# Patient Record
Sex: MALE | Race: WHITE | NOT HISPANIC OR LATINO | Employment: UNEMPLOYED | URBAN - NONMETROPOLITAN AREA
[De-identification: names, ages, dates, MRNs, and addresses within clinical notes are randomized per-mention and may not be internally consistent; named-entity substitution may affect disease eponyms.]

---

## 2020-08-17 ENCOUNTER — OFFICE VISIT (OUTPATIENT)
Dept: FAMILY MEDICINE | Facility: OTHER | Age: 55
End: 2020-08-17
Payer: COMMERCIAL

## 2020-08-17 VITALS
SYSTOLIC BLOOD PRESSURE: 150 MMHG | TEMPERATURE: 96.8 F | HEART RATE: 78 BPM | WEIGHT: 157.6 LBS | OXYGEN SATURATION: 100 % | DIASTOLIC BLOOD PRESSURE: 80 MMHG | RESPIRATION RATE: 18 BRPM

## 2020-08-17 DIAGNOSIS — M79.661 RIGHT CALF PAIN: ICD-10-CM

## 2020-08-17 DIAGNOSIS — S86.811A STRAIN OF CALF MUSCLE, RIGHT, INITIAL ENCOUNTER: Primary | ICD-10-CM

## 2020-08-17 LAB — D DIMER PPP DDU-MCNC: <200 NG/ML D-DU (ref 0–230)

## 2020-08-17 PROCEDURE — 99202 OFFICE O/P NEW SF 15 MIN: CPT | Performed by: PHYSICIAN ASSISTANT

## 2020-08-17 PROCEDURE — 36415 COLL VENOUS BLD VENIPUNCTURE: CPT | Mod: ZL | Performed by: PHYSICIAN ASSISTANT

## 2020-08-17 PROCEDURE — G0463 HOSPITAL OUTPT CLINIC VISIT: HCPCS

## 2020-08-17 PROCEDURE — 85379 FIBRIN DEGRADATION QUANT: CPT | Mod: ZL | Performed by: PHYSICIAN ASSISTANT

## 2020-08-17 RX ORDER — CARBAMAZEPINE 100 MG/1
100 TABLET, EXTENDED RELEASE ORAL 2 TIMES DAILY
COMMUNITY
End: 2022-01-01

## 2020-08-17 RX ORDER — PHENYTOIN 50 MG/1
50 TABLET, CHEWABLE ORAL 2 TIMES DAILY
COMMUNITY
End: 2020-08-21 | Stop reason: DRUGHIGH

## 2020-08-17 SDOH — HEALTH STABILITY: MENTAL HEALTH: HOW OFTEN DO YOU HAVE A DRINK CONTAINING ALCOHOL?: 2-3 TIMES A WEEK

## 2020-08-17 ASSESSMENT — PAIN SCALES - GENERAL: PAINLEVEL: EXTREME PAIN (8)

## 2020-08-17 NOTE — PATIENT INSTRUCTIONS
Calf strain versus achilles strain  Will treat symptomatically  Ice and heat 10--20 minutes 4-5 times daily, can alterate these treatments  Ibuprofen 600-800 mg every 6-8 hours, max 2400 mg/day. Take with food.   Tylenol 650-1000 mg every 4-6 hours, max 4000 mg/day  Compression sleeve  OTC heel lift   Elevate foot  Referral in place for sports medicine, they will call you for a follow up  Follow up with PCP as needed    Patient Education     Muscle Strain in the Extremities  A muscle strain is a stretching and tearing of muscle fibers. This causes pain, especially when you move that muscle. There may also be some swelling and bruising.  Home care    Keep the hurt area raised above heart level to reduce pain and swelling. This is especially important during the first 48 hours.    Apply an ice pack over the injured area for 15 to 20 minutes every 3 to 6 hours. You should do this for the first 24 to 48 hours. You can make an ice pack by filling a plastic bag that seals at the top with ice cubes and then wrapping it with a thin towel. Be careful not to injure your skin with the ice treatments. Ice should never be applied directly to skin. Continue the use of ice packs for relief of pain and swelling as needed. After 48 hours, apply heat (warm shower or warm bath) for 15 to 20 minutes several times a day, or alternate ice and heat.    You may use over-the-counter pain medicine to control pain, unless another medicine was prescribed. If you have chronic liver or kidney disease or ever had a stomach ulcer or gastrointestinal bleeding, talk with your healthcare provider before using these medicines.    For leg strains: If crutches have been recommended, don t put full weight on the hurt leg until you can do so without pain. You can return to sports when you are able to hop and run on the injured leg without pain.  Follow-up care  Follow up with your healthcare provider, or as advised.  When to seek medical advice  Call your  healthcare provider right away if any of these occur:    The toes of the injured leg become swollen, cold, blue, numb, or tingly    Pain or swelling increases  Date Last Reviewed: 5/1/2018 2000-2019 [employer]. [address] [place] [address], [place] [address]. All rights reserved. This information is not intended as a substitute for professional medical care. Always follow your healthcare professional's instructions.           Patient Education     Muscle Strain in the Extremities  A muscle strain is a stretching and tearing of muscle fibers. This causes pain, especially when you move that muscle. There may also be some swelling and bruising.  Home care    Keep the hurt area raised above heart level to reduce pain and swelling. This is especially important during the first 48 hours.    Apply an ice pack over the injured area for 15 to 20 minutes every 3 to 6 hours. You should do this for the first 24 to 48 hours. You can make an ice pack by filling a plastic bag that seals at the top with ice cubes and then wrapping it with a thin towel. Be careful not to injure your skin with the ice treatments. Ice should never be applied directly to skin. Continue the use of ice packs for relief of pain and swelling as needed. After 48 hours, apply heat (warm shower or warm bath) for 15 to 20 minutes several times a day, or alternate ice and heat.    You may use over-the-counter pain medicine to control pain, unless another medicine was prescribed. If you have chronic liver or kidney disease or ever had a stomach ulcer or gastrointestinal bleeding, talk with your healthcare provider before using these medicines.    For leg strains: If crutches have been recommended, don t put full weight on the hurt leg until you can do so without pain. You can return to sports when you are able to hop and run on the injured leg without pain.  Follow-up care  Follow up with your healthcare provider, or as advised.  When to seek medical  advice  Call your healthcare provider right away if any of these occur:    The toes of the injured leg become swollen, cold, blue, numb, or tingly    Pain or swelling increases  Date Last Reviewed: 5/1/2018 2000-2019 [employer]. [address] [place] [address]. All rights reserved. This information is not intended as a substitute for professional medical care. Always follow your healthcare professional's instructions.           Patient Education     Understanding Achilles Tendonitis    Achilles tendonitis is an overuse injury. It results in inflammation of the Achilles tendon. This tendon is found on the back of the ankle. It links the calf muscle to the heel bone. It helps you do pushing-off movements like running or standing on your toes.     How to say it  [id number]   What causes Achilles tendonitis?  Achilles tendonitis can happen if you do an activity like running, walking, or jumping too much. This overuse can strain, or pull, the tendon. It may lead to minor tearing of the tendon. An injury to the lower leg or foot can also cause it.  If you don t warm up before taking part in sports such as basketball, you are more likely to suffer from this condition. You are also more prone to it if you do too much of such an activity too quickly. Proper training and rest can help prevent it.  Symptoms of Achilles tendonitis  The main symptom of Achilles tendonitis is pain. This pain mostly happens when you move the ankle. The tendon may also feel stiff after a period of no activity, such as sleeping. It may also become swollen. You may hear a crackling sound when you move your ankle.  Treatment for Achilles tendonitis  Symptoms often get better after starting treatment. A full recovery may take several months. Treatments include:    Rest. You should stop or change the activity that caused the injury. The tendon will then have time to heal.    Cold or heat pack. These help reduce  pain and swelling.    Prescription or over-the-counter pain medicines. These help reduce pain and swelling.    Shoe inserts. These devices can reduce strain on the Achilles tendon when you move. You may then feel less pain.    Stretching and strengthening exercises. Certain exercises can help you regain flexibility and strength in your Achilles tendon.    Surgery. This option can fix the injured tendon. But you don t often need it unless other treatments don t work.     When to call your healthcare provider   Call your healthcare provider right away if you have any of these:    Fever of 100.4 F (38 C) or higher, or as directed    Pain that gets worse    Symptoms that don t get better, or get worse    New symptoms    Date Last Reviewed: 3/10/2016    8210-2875 [employer]. [address] [place] [address], [place] [address]. All rights reserved. This information is not intended as a substitute for professional medical care. Always follow your healthcare professional's instructions.

## 2020-08-17 NOTE — NURSING NOTE
Chief Complaint   Patient presents with     Musculoskeletal Problem     right leg      Musculoskeletal Problem     right side of right foot     Patient states he felt like he pulled his calf muscle on Friday of last week in his right leg and then it started to go down into is foot, and he states that now the right side of his right foot is numb.     Initial BP (!) 150/80 (BP Location: Right arm, Patient Position: Sitting, Cuff Size: Adult Regular)   Pulse 78   Temp 96.8  F (36  C) (Temporal)   Resp 18   Wt 71.5 kg (157 lb 9.6 oz)   SpO2 100%  There is no height or weight on file to calculate BMI.    Medication Reconciliation: complete      Elle Riley

## 2020-08-17 NOTE — PROGRESS NOTES
SUBJECTIVE:  Raúl Adams is a 54 year old male who sustained a right calf muscle injury about 4-5 days ago.   Mechanism of injury: He was lifting a 250 pound fuel tank with a efraín and as he lifted it he felt the pain in his posterior right calf.    Associated symptoms - pain is 0/10 at rest and can get to 10/10 over the course of the day as he walks on it. No leg swelling, ecchymosis, warmth. Pain can also present over dorsal ankle. The right side of his foot has decreased sensation.     Location - Right leg  Quality - ache   Severity - 10/10  Frequency - intemittent  Aggravated by - walking  Alleviated by - rest  Treatments - ice intermittently, ibuprofen  Prior fracture or injury - no known prior injury      No past medical history on file.  Current Outpatient Medications   Medication     carBAMazepine (TEGRETOL XR) 100 MG 12 hr tablet     phenytoin (DILANTIN) 50 MG chewable tablet     No current facility-administered medications for this visit.       No Known Allergies      ROS  General: feels well, no fever  Musculoskeletal: injury per HPI      OBJECTIVE:  Vitals:    08/17/20 1517   BP: (!) 150/80   BP Location: Right arm   Patient Position: Sitting   Cuff Size: Adult Regular   Pulse: 78   Resp: 18   Temp: 96.8  F (36  C)   TempSrc: Temporal   SpO2: 100%   Weight: 71.5 kg (157 lb 9.6 oz)     Vital signs as noted above.  Appearance: in no apparent distress.  Musculoskeletal:  Right calf/foot  Inspection: No noted abnormality: No ecchymosis, erythema, swelling.  Calves measure symmetrically 33.5 cm.   Palpation: Right calf mildly tender to palpation at distal gastrocnemius muscle.  Mildly tender to palpation insertion of Achilles tendon.  Ankle is non tender.  Foot is non tender. Negative viveros sign. Symmetrical Right to left.   Neurovascular: normal pulses, cap refill, reduced sensation to light touch over lateral right foot, normal temperature  ROM: normal    Results for orders placed or performed in  visit on 08/17/20   D-Dimer GH     Status: None   Result Value Ref Range    D-Dimer ng/mL <200 0 - 230 ng/ml D-DU       ASSESSMENT:   Diagnosis Comments   1. Right calf pain  D-Dimer GH    2. Strain of calf muscle, right, initial encounter  Orthopedic & Spine  Referral          PLAN:  Right calf pain with reduced sensation over lateral right foot on exam. No visible abnormality. Mildly TTP over calf and insertion of achilles tendon. Negative viveros sign.   Discussed consideration of calf strain, tendonitis, achilles tendon strain/sprain, DVT, compartment syndrome.   D-dimer is not elevated.   Will treat symptomatically to cover for achilles strain/calf strain/tendonitis  Rest, use heel lift (OTC), ice, compression to foot/leg, elevation, ibuprofen and tylenol  Referral to sports medicine  Patient received verbal and written instruction including review of warning signs    Mel Moyer PA-C on 8/18/2020 at 6:14 PM

## 2020-08-21 ENCOUNTER — OFFICE VISIT (OUTPATIENT)
Dept: FAMILY MEDICINE | Facility: OTHER | Age: 55
End: 2020-08-21
Attending: PHYSICIAN ASSISTANT
Payer: COMMERCIAL

## 2020-08-21 VITALS
OXYGEN SATURATION: 99 % | RESPIRATION RATE: 18 BRPM | TEMPERATURE: 98.2 F | DIASTOLIC BLOOD PRESSURE: 80 MMHG | HEART RATE: 82 BPM | WEIGHT: 157 LBS | SYSTOLIC BLOOD PRESSURE: 132 MMHG | BODY MASS INDEX: 20.81 KG/M2 | HEIGHT: 73 IN

## 2020-08-21 DIAGNOSIS — S86.111A GASTROCNEMIUS MUSCLE TEAR, RIGHT, INITIAL ENCOUNTER: Primary | ICD-10-CM

## 2020-08-21 PROCEDURE — 99213 OFFICE O/P EST LOW 20 MIN: CPT | Performed by: PHYSICIAN ASSISTANT

## 2020-08-21 PROCEDURE — G0463 HOSPITAL OUTPT CLINIC VISIT: HCPCS

## 2020-08-21 RX ORDER — PHENYTOIN SODIUM 100 MG/1
2 CAPSULE, EXTENDED RELEASE ORAL 3 TIMES DAILY
COMMUNITY
Start: 2020-08-02 | End: 2022-01-01

## 2020-08-21 RX ORDER — TRAMADOL HYDROCHLORIDE 50 MG/1
50 TABLET ORAL EVERY 6 HOURS PRN
Qty: 10 TABLET | Refills: 0 | Status: SHIPPED | OUTPATIENT
Start: 2020-08-21 | End: 2020-08-24

## 2020-08-21 ASSESSMENT — MIFFLIN-ST. JEOR: SCORE: 1601.03

## 2020-08-21 ASSESSMENT — PAIN SCALES - GENERAL: PAINLEVEL: EXTREME PAIN (8)

## 2020-08-21 NOTE — PROGRESS NOTES
"SUBJECTIVE:   Raúl Adams is a 54 year old male here for the following health issues:    HPI  Right calf pain for the last 2 weeks.  Patient states about 3 weeks ago he attempted to lift a 250 gallon oil tank.  At the time he did not notice any pain of the calf however about a week after that he started and also noted some calf pain especially with walking.  Today, he notes a pain in the middle part of his gastrocnemius muscle with walking upstairs.  He states last night he woke up at about 2 AM with severe pain and was unable to sleep.  He is tried occasional ibuprofen with minimal relief of symptoms.  He has tried occasional icing as well with minimal relief relief.  He is also noted slight numbness on the top of his foot in the last 2 weeks.  He denies any weakness or other numbness of his foot.  He has not noticed any locking or grinding of his knee.  He has noted slight bruising over the anterior portion of his ankle near the talus.  He denies any pain or instability of the ankle or knee.    Allergies:  No Known Allergies    Review of Systems   As above otherwise ROS is unremarkable.     OBJECTIVE:     Vitals:    08/21/20 1339   BP: 132/80   BP Location: Right arm   Patient Position: Sitting   Cuff Size: Adult Regular   Pulse: 82   Resp: 18   Temp: 98.2  F (36.8  C)   TempSrc: Temporal   SpO2: 99%   Weight: 71.2 kg (157 lb)   Height: 1.854 m (6' 1\")       Physical Exam  General Appearance: Pleasant, alert, appropriate appearance for age and circumstances, no acute distress  Head: Normocephalic, atraumatic  Musculoskeletal: Tenderness to palpation of the medial gastrocnemius with slight bandlike formation under the most tender portion.  No discernable muscle atrophy or weakness; full joint range of motion, no instability, redness, or swelling of the calf.  Skin: Slight bruising over the anterior portion of the ankle.  No concerning or new rashes  Vascular: Radial, posterior tibial, and dorsalis pedis " pulses present bilaterally and not bounding or thready; No carotid bruit on auscultation; No jugular venous distention with liver palpation; No lower extremity edema or varicose veins.  Neurologic Exam: Slight decreased sensation over the anterior portion of the foot to light touch.  Antalgic gait.   Symmetric DTRs, no focal motor deficits. No tremor.  Psychiatric Exam: Alert and oriented, appropriate affect    ASSESSMENT/PLAN:     1. Gastrocnemius muscle tear, right, initial encounter        Tramadol 50 mg 1-2 of these before bed for severe pain.  If pain persist during that I recommend that he try icing    Ibuprofen 600 mg 3 - 4 times a day for the next 5-7 days.     Tylenol 500 mg 3 - 4 times a day for the next 5-7 days.     Ice 20 minutes 3-4 times a day for a week    Follow-up in 2 to 4 weeks or sooner if symptoms persist or worsen    ROWAN Jarquin  [facility]    This document was prepared using voice generated software.  While every attempt was made for accuracy, grammatical errors may exist.

## 2020-08-21 NOTE — NURSING NOTE
"Chief Complaint   Patient presents with     Leg Pain     calf    Patient says he was seen on the 17 th for his right calf pain. He says it has not improved at all.     Initial /80 (BP Location: Right arm, Patient Position: Sitting, Cuff Size: Adult Regular)   Pulse 82   Temp 98.2  F (36.8  C) (Temporal)   Resp 18   Ht 1.854 m (6' 1\")   Wt 71.2 kg (157 lb)   SpO2 99%   BMI 20.71 kg/m   Estimated body mass index is 20.71 kg/m  as calculated from the following:    Height as of this encounter: 1.854 m (6' 1\").    Weight as of this encounter: 71.2 kg (157 lb).  Medication Reconciliation: complete    Faviola White LPN  "

## 2020-08-21 NOTE — PATIENT INSTRUCTIONS
Instructions:    I sent a prescription for Tramadol to [facility]. Take 1-2 of these at night before bed for severe pain. If pain is worse at night try to ice the leg for 20 minutes.    Also, take Ibuprofen 600 mg 3 - 4 times a day for the next 5-7 days.     Also, take Tylenol 500 mg 3 - 4 times a day for the next 5-7 days.     Also, ice the calf 20 minutes 3-4 times a day for a week.     If you pain is still severe please call to ask about being seen in the clinic again.     Sincerely,    ROWAN Jason  Family Medicine  [facility]

## 2020-09-17 ENCOUNTER — HOSPITAL ENCOUNTER (OUTPATIENT)
Dept: GENERAL RADIOLOGY | Facility: OTHER | Age: 55
End: 2020-09-17
Attending: PHYSICIAN ASSISTANT
Payer: COMMERCIAL

## 2020-09-17 ENCOUNTER — OFFICE VISIT (OUTPATIENT)
Dept: FAMILY MEDICINE | Facility: OTHER | Age: 55
End: 2020-09-17
Attending: PHYSICIAN ASSISTANT
Payer: COMMERCIAL

## 2020-09-17 VITALS
RESPIRATION RATE: 18 BRPM | BODY MASS INDEX: 20.9 KG/M2 | TEMPERATURE: 98.1 F | SYSTOLIC BLOOD PRESSURE: 144 MMHG | HEART RATE: 72 BPM | WEIGHT: 158.4 LBS | DIASTOLIC BLOOD PRESSURE: 78 MMHG

## 2020-09-17 DIAGNOSIS — S90.31XA CONTUSION OF RIGHT FOOT, INITIAL ENCOUNTER: Primary | ICD-10-CM

## 2020-09-17 DIAGNOSIS — S99.921A FOOT INJURY, RIGHT, INITIAL ENCOUNTER: ICD-10-CM

## 2020-09-17 PROCEDURE — 73630 X-RAY EXAM OF FOOT: CPT | Mod: RT

## 2020-09-17 PROCEDURE — G0463 HOSPITAL OUTPT CLINIC VISIT: HCPCS

## 2020-09-17 PROCEDURE — 99213 OFFICE O/P EST LOW 20 MIN: CPT | Performed by: PHYSICIAN ASSISTANT

## 2020-09-17 ASSESSMENT — PAIN SCALES - GENERAL: PAINLEVEL: EXTREME PAIN (9)

## 2020-09-17 NOTE — NURSING NOTE
"Chief Complaint   Patient presents with     Musculoskeletal Problem     right foot     Patient is here for right foot pain that started last week after a ladder fell ontop of it.    Initial BP (!) 144/78   Pulse 72   Temp 98.1  F (36.7  C) (Tympanic)   Resp 18   Wt 71.8 kg (158 lb 6.4 oz)   BMI 20.90 kg/m   Estimated body mass index is 20.9 kg/m  as calculated from the following:    Height as of 8/21/20: 1.854 m (6' 1\").    Weight as of this encounter: 71.8 kg (158 lb 6.4 oz).  Medication Reconciliation: complete    Gabriella Camacho LPN  "

## 2020-09-17 NOTE — PATIENT INSTRUCTIONS
Right foot pain after an injury 1 week ago when a extension ladder fell on the top of his foot.  X-ray right foot: Negative for fracture: There is mild degenerative changes in a few of the interphalangeal joints and the first metatarsal phalangeal joint.  Osteopenia.     We will treat symptomatically for contusion  Rest, elevate, ice 10 to 20 minutes every 1-2 hours  Ibuprofen 800 mg every 6-8 hours, take with food.  Max dose is 2400 mg per 24 hours.  Tylenol 1000 mg every 4-6 hours.  Max dose is 4000 mg per 24 hours  Follow-up with primary care if symptoms persist or worsen.   Patient Education     Foot Contusion  You have a contusion. This is also called a bruise. There is swelling and some bleeding under the skin, but no broken bones. This injury generally takes a few days to a few weeks to heal.  During that time, the bruise will typically change in color from reddish, to purple-blue, to greenish-yellow, then to yellow-brown.  Home care    Elevate the foot to reduce pain and swelling. As much as possible, sit or lie down with the foot raised about the level of your heart. This is especially important during the first 48 hours.    Ice the foot to help reduce pain and swelling. Wrap a cold source (ice pack or ice cubes in a plastic bag) in a thin towel. Apply to the bruised area for 20 minutes every 1 to 2 hours the first day. Continue this 3 to 4 times a day until the pain and swelling goes away.    Unless another medicine was prescribed, you can take acetaminophen, ibuprofen, or naproxen to control pain. (If you have chronic liver or kidney disease or ever had a stomach ulcer or gastrointestinal bleeding, talk with your healthcare provider before using these medicines.)  Follow up  Follow up with your healthcare provider or our staff as advised. Call if you are not improving within 1 to 2 weeks.  When to seek medical advice   Call your healthcare provider right away if you have any of the following:    Increased  pain or swelling    Foot or leg becomes cold, blue, numb or tingly    Signs of infection: Warmth, drainage, or increased redness or pain around the bruise    Inability to move the injured foot     Frequent bruising for unknown reasons  Date Last Reviewed: 2/1/2017 2000-2019 [employer]. [address] [place] [address]. All rights reserved. This information is not intended as a substitute for professional medical care. Always follow your healthcare professional's instructions.

## 2020-09-17 NOTE — PROGRESS NOTES
SUBJECTIVE:  Raúl Adams is a 55 year old male who sustained a injury to his right foot.  Onset 1 week ago.   Mechanism of injury: He was in the garage and a large extension ladder fell on the top of his right foot causing pain and swelling.   Associated symptoms - pain and mild swelling    Location -right dorsal foot  Quality -ache/sharp pain  Severity - 9/10  Frequency -constant  Aggravated by -aggravated by ambulating & bumping it  Alleviated by -nothing  Treatments -Tylenol  Prior fracture or injury -he has recent gastro-knee medius muscle tear on the same leg and this pain has been radiating to his foot so is not sure if the pain is completely from the injury or from the previous gastrocnemius strain      History reviewed. No pertinent past medical history.  Current Outpatient Medications   Medication     carBAMazepine (TEGRETOL XR) 100 MG 12 hr tablet     phenytoin (DILANTIN) 100 MG capsule     No current facility-administered medications for this visit.       No Known Allergies      ROS  General: feels well, no fever  Musculoskeletal: injury per HPI      OBJECTIVE:  Vitals:    09/17/20 1438   BP: (!) 144/78   Pulse: 72   Resp: 18   Temp: 98.1  F (36.7  C)   TempSrc: Tympanic   Weight: 71.8 kg (158 lb 6.4 oz)     Vital signs as noted above.  Appearance: in no apparent distress.  Musculoskeletal: Right foot  Inspection: Mild dorsal foot swelling  Palpation: Tender to palpation dorsal foot  Neurovascular: normal pulses, cap refill, sensation to soft touch, temperature  ROM: normal    Results for orders placed or performed in visit on 09/17/20   XR Foot Right G/E 3 Views     Status: None    Narrative    PROCEDURE: XR FOOT RT G/E 3 VW 9/17/2020 2:55 PM    HISTORY: an extension ladder fell on the top of his right foot. Pain  mid dorsal foot; Foot injury, right, initial encounter    COMPARISONS: None.    TECHNIQUE: 3 views.    FINDINGS: There is generalized osteopenia.  No fracture or dislocation  is seen.  There is no focal bone lesion.    There is mild degenerative change in a few interphalangeal joints and  in the first metatarsal phalangeal joint.         Impression    IMPRESSION: Osteopenia without definite fracture.    IRENE BAILON MD         ASSESSMENT:     Diagnosis Comments   1. Contusion of right foot, initial encounter   symptomatic treatments   2. Foot injury, right, initial encounter  XR Foot Right G/E 3 Views          PLAN:  Right foot pain after an injury 1 week ago when a extension ladder fell on the top of his foot.  X-ray right foot: Negative for fracture.  Image independently reviewed.  Radiology findings: There is mild degenerative changes in a few of the interphalangeal joints and the first metatarsal phalangeal joint. Osteopenia.     We will treat symptomatically for contusion  Offered crutches due to significant level of pain, patient declined this today  Rest, elevate, ice 10 to 20 minutes every 1-2 hours  Ibuprofen 800 mg every 6-8 hours, take with food.  Max dose is 2400 mg per 24 hours.  Tylenol 1000 mg every 4-6 hours.  Max dose is 4000 mg per 24 hours  Follow-up with primary care if symptoms persist or worsen.   Patient received verbal and written instruction including review of warning signs    Mel Moyer PA-C on 9/17/2020 at 5:00 PM

## 2020-09-29 ENCOUNTER — OFFICE VISIT (OUTPATIENT)
Dept: ORTHOPEDICS | Facility: OTHER | Age: 55
End: 2020-09-29
Attending: PHYSICIAN ASSISTANT
Payer: COMMERCIAL

## 2020-09-29 VITALS
SYSTOLIC BLOOD PRESSURE: 140 MMHG | WEIGHT: 158 LBS | HEART RATE: 84 BPM | DIASTOLIC BLOOD PRESSURE: 84 MMHG | BODY MASS INDEX: 20.85 KG/M2

## 2020-09-29 DIAGNOSIS — M79.671 RIGHT FOOT PAIN: Primary | ICD-10-CM

## 2020-09-29 DIAGNOSIS — S86.811A STRAIN OF CALF MUSCLE, RIGHT, INITIAL ENCOUNTER: ICD-10-CM

## 2020-09-29 PROCEDURE — 99202 OFFICE O/P NEW SF 15 MIN: CPT | Performed by: ORTHOPAEDIC SURGERY

## 2020-09-29 PROCEDURE — G0463 HOSPITAL OUTPT CLINIC VISIT: HCPCS

## 2020-09-29 NOTE — PROGRESS NOTES
Visit Date:   09/29/2020      ORTHOPEDIC CLINIC VISIT      REASON FOR EVALUATION:  Right calf strain.      HISTORY:  Raúl comes in with regard to his right calf.  He had a strain here about a month back.  He also had a ladder land on his foot the subsequent day.  Reports significant pain associated with those 2 incidents, but his calf has more or less settled down.  He has been doing stretching here.  He is trying to work through that at this point in time.  He is also reporting foot pain here in addition to that and that is mostly in the mid foot area secondary to the impact from the ladder itself at this point in time, both on the right side.  The patient reports pain worse with activity, a little bit better with rest.  He gets occasional swelling.  Pain can be up to an 8 and he is using anti-inflammatories at this time.  He did have x-rays done and I reviewed them.  No acute fractures otherwise seen.      MEDICATIONS:  Reviewed.      ALLERGIES:  NO KNOWN DRUG ALLERGIES.      REVIEW OF SYSTEMS:  Otherwise negative with the exception as stated above.      PHYSICAL EXAMINATION:   VITAL SIGNS:  The patient is 158 pounds.  Blood pressure 140/84, pulse 84.   GENERAL:  He is alert and oriented x3, cooperative with exam, in no acute distress.  He does ambulate with satisfactory gait.  Affect is appropriate.     EXTREMITIES:  Examination of the right lower extremity shows tenderness across the midcalf region and intact Achilles seen distally.  Negative Barr test otherwise seen.  He has tenderness across the mid metatarsal region.  The foot itself has good dorsiflexion as well as plantarflexion with no significant limitation.  Overall strength is adequate.  He does have some mild swelling on the dorsal aspect of his foot here predominantly.  Neurovascular examination otherwise intact at this time.      IMAGING:  X-rays reviewed.  No acute fracture seen.      IMPRESSION:     1.  Right foot contusion.   2.  Right calf  strain.      PLAN:  Reassurance here.  Continue to work through the healing process here, anti-inflammatories, time, ice and elevation.  I will plan to revisit with patient otherwise on a p.r.n. basis and anticipate continued resolution over the next subsequent 6 weeks or thereabouts.  If his pain does persist, certainly therapies would be recommended in that scenario.         GEOFFREY KNAPP MD             D: 2020   T: 2020   MT: NICHOLAS      Name:     NIDHI BUCKNER   MRN:      [id number]        Account:      [id number]   :      1965           Visit Date:   2020      Document: [id number]

## 2022-01-01 ENCOUNTER — HOSPITAL ENCOUNTER (OUTPATIENT)
Dept: MRI IMAGING | Facility: OTHER | Age: 57
Discharge: HOME | End: 2022-11-05
Attending: STUDENT IN AN ORGANIZED HEALTH CARE EDUCATION/TRAINING PROGRAM | Admitting: STUDENT IN AN ORGANIZED HEALTH CARE EDUCATION/TRAINING PROGRAM
Payer: COMMERCIAL

## 2022-01-01 ENCOUNTER — ANESTHESIA EVENT (OUTPATIENT)
Dept: SURGERY | Facility: OTHER | Age: 57
End: 2022-01-01
Payer: COMMERCIAL

## 2022-01-01 ENCOUNTER — HOSPITAL ENCOUNTER (OUTPATIENT)
Facility: OTHER | Age: 57
Discharge: HOME | End: 2022-09-13
Attending: SURGERY | Admitting: SURGERY
Payer: COMMERCIAL

## 2022-01-01 ENCOUNTER — ANESTHESIA (OUTPATIENT)
Dept: SURGERY | Facility: OTHER | Age: 57
End: 2022-01-01
Payer: COMMERCIAL

## 2022-01-01 ENCOUNTER — TELEPHONE (OUTPATIENT)
Dept: INTERNAL MEDICINE | Facility: OTHER | Age: 57
End: 2022-01-01

## 2022-01-01 ENCOUNTER — OFFICE VISIT (OUTPATIENT)
Dept: INTERNAL MEDICINE | Facility: OTHER | Age: 57
End: 2022-01-01
Attending: STUDENT IN AN ORGANIZED HEALTH CARE EDUCATION/TRAINING PROGRAM
Payer: COMMERCIAL

## 2022-01-01 ENCOUNTER — LAB (OUTPATIENT)
Dept: LAB | Facility: OTHER | Age: 57
End: 2022-01-01
Payer: COMMERCIAL

## 2022-01-01 ENCOUNTER — ALLIED HEALTH/NURSE VISIT (OUTPATIENT)
Dept: FAMILY MEDICINE | Facility: OTHER | Age: 57
End: 2022-01-01
Attending: STUDENT IN AN ORGANIZED HEALTH CARE EDUCATION/TRAINING PROGRAM
Payer: COMMERCIAL

## 2022-01-01 VITALS
BODY MASS INDEX: 20.02 KG/M2 | HEIGHT: 74 IN | RESPIRATION RATE: 16 BRPM | SYSTOLIC BLOOD PRESSURE: 128 MMHG | OXYGEN SATURATION: 100 % | TEMPERATURE: 97.9 F | HEART RATE: 80 BPM | WEIGHT: 156 LBS | DIASTOLIC BLOOD PRESSURE: 64 MMHG

## 2022-01-01 VITALS
OXYGEN SATURATION: 100 % | BODY MASS INDEX: 19.65 KG/M2 | SYSTOLIC BLOOD PRESSURE: 112 MMHG | DIASTOLIC BLOOD PRESSURE: 55 MMHG | HEIGHT: 75 IN | HEART RATE: 61 BPM | RESPIRATION RATE: 16 BRPM | WEIGHT: 158 LBS | TEMPERATURE: 97.6 F

## 2022-01-01 DIAGNOSIS — Z00.00 HEALTHCARE MAINTENANCE: Primary | ICD-10-CM

## 2022-01-01 DIAGNOSIS — Z87.898 HISTORY OF SEIZURE: Primary | ICD-10-CM

## 2022-01-01 DIAGNOSIS — Z71.6 ENCOUNTER FOR SMOKING CESSATION COUNSELING: ICD-10-CM

## 2022-01-01 DIAGNOSIS — Z20.822 COVID-19 RULED OUT: Primary | ICD-10-CM

## 2022-01-01 DIAGNOSIS — G40.909 NONINTRACTABLE EPILEPSY WITHOUT STATUS EPILEPTICUS, UNSPECIFIED EPILEPSY TYPE (H): Primary | ICD-10-CM

## 2022-01-01 DIAGNOSIS — Z12.11 ENCOUNTER FOR SCREENING COLONOSCOPY: ICD-10-CM

## 2022-01-01 DIAGNOSIS — G40.909 NONINTRACTABLE EPILEPSY WITHOUT STATUS EPILEPTICUS, UNSPECIFIED EPILEPSY TYPE (H): ICD-10-CM

## 2022-01-01 DIAGNOSIS — F17.200 TOBACCO USE DISORDER: ICD-10-CM

## 2022-01-01 LAB
ALBUMIN SERPL-MCNC: 4.3 G/DL (ref 3.5–5.7)
ALP SERPL-CCNC: 120 U/L (ref 34–104)
ALT SERPL W P-5'-P-CCNC: 29 U/L (ref 7–52)
ANION GAP SERPL CALCULATED.3IONS-SCNC: 5 MMOL/L (ref 3–14)
AST SERPL W P-5'-P-CCNC: 37 U/L (ref 13–39)
BASOPHILS # BLD AUTO: 0 10E3/UL (ref 0–0.2)
BASOPHILS NFR BLD AUTO: 1 %
BILIRUB SERPL-MCNC: 0.5 MG/DL (ref 0.3–1)
BUN SERPL-MCNC: 12 MG/DL (ref 7–25)
CALCIUM SERPL-MCNC: 9.3 MG/DL (ref 8.6–10.3)
CARBAMAZEPINE EP SERPL-MCNC: 1.8 UG/ML
CARBAMAZEPINE SERPL-MCNC: 7.6 UG/ML
CHLORIDE BLD-SCNC: 96 MMOL/L (ref 98–107)
CO2 SERPL-SCNC: 26 MMOL/L (ref 21–31)
CREAT SERPL-MCNC: 0.67 MG/DL (ref 0.7–1.3)
DEPRECATED CALCIDIOL+CALCIFEROL SERPL-MC: 24 UG/L (ref 30–100)
EOSINOPHIL # BLD AUTO: 0 10E3/UL (ref 0–0.7)
EOSINOPHIL NFR BLD AUTO: 1 %
ERYTHROCYTE [DISTWIDTH] IN BLOOD BY AUTOMATED COUNT: 13.3 % (ref 10–15)
GFR SERPL CREATININE-BSD FRML MDRD: >90 ML/MIN/1.73M2
GLUCOSE BLD-MCNC: 96 MG/DL (ref 70–105)
HCT VFR BLD AUTO: 34.9 % (ref 40–53)
HGB BLD-MCNC: 12.8 G/DL (ref 13.3–17.7)
HOLD SPECIMEN: NORMAL
HOLD SPECIMEN: NORMAL
IMM GRANULOCYTES # BLD: 0 10E3/UL
IMM GRANULOCYTES NFR BLD: 0 %
LEVETIRACETAM SERPL-MCNC: 23.3 ΜG/ML (ref 10–40)
LYMPHOCYTES # BLD AUTO: 1.1 10E3/UL (ref 0.8–5.3)
LYMPHOCYTES NFR BLD AUTO: 21 %
MCH RBC QN AUTO: 34.6 PG (ref 26.5–33)
MCHC RBC AUTO-ENTMCNC: 36.7 G/DL (ref 31.5–36.5)
MCV RBC AUTO: 94 FL (ref 78–100)
MONOCYTES # BLD AUTO: 0.6 10E3/UL (ref 0–1.3)
MONOCYTES NFR BLD AUTO: 11 %
NEUTROPHILS # BLD AUTO: 3.5 10E3/UL (ref 1.6–8.3)
NEUTROPHILS NFR BLD AUTO: 66 %
NRBC # BLD AUTO: 0 10E3/UL
NRBC BLD AUTO-RTO: 0 /100
PATH REPORT.COMMENTS IMP SPEC: NORMAL
PATH REPORT.FINAL DX SPEC: NORMAL
PATH REPORT.RELEVANT HX SPEC: NORMAL
PHENYTOIN FREE SERPL-MCNC: 0.8 UG/ML
PHENYTOIN SERPL-MCNC: 12.2 MG/L
PHOTO IMAGE: NORMAL
PLATELET # BLD AUTO: 261 10E3/UL (ref 150–450)
POTASSIUM BLD-SCNC: 4.7 MMOL/L (ref 3.5–5.1)
PROT SERPL-MCNC: 7.3 G/DL (ref 6.4–8.9)
RBC # BLD AUTO: 3.7 10E6/UL (ref 4.4–5.9)
SARS-COV-2 RNA RESP QL NAA+PROBE: NEGATIVE
SODIUM SERPL-SCNC: 127 MMOL/L (ref 134–144)
WBC # BLD AUTO: 5.2 10E3/UL (ref 4–11)

## 2022-01-01 PROCEDURE — 250N000009 HC RX 250: Performed by: NURSE ANESTHETIST, CERTIFIED REGISTERED

## 2022-01-01 PROCEDURE — 250N000013 HC RX MED GY IP 250 OP 250 PS 637: Performed by: SURGERY

## 2022-01-01 PROCEDURE — 80186 ASSAY OF PHENYTOIN FREE: CPT | Mod: ZL | Performed by: STUDENT IN AN ORGANIZED HEALTH CARE EDUCATION/TRAINING PROGRAM

## 2022-01-01 PROCEDURE — 258N000003 HC RX IP 258 OP 636: Performed by: SURGERY

## 2022-01-01 PROCEDURE — 99406 BEHAV CHNG SMOKING 3-10 MIN: CPT | Performed by: STUDENT IN AN ORGANIZED HEALTH CARE EDUCATION/TRAINING PROGRAM

## 2022-01-01 PROCEDURE — 45385 COLONOSCOPY W/LESION REMOVAL: CPT | Mod: PT | Performed by: SURGERY

## 2022-01-01 PROCEDURE — 88342 IMHCHEM/IMCYTCHM 1ST ANTB: CPT

## 2022-01-01 PROCEDURE — 85025 COMPLETE CBC W/AUTO DIFF WBC: CPT | Mod: ZL | Performed by: STUDENT IN AN ORGANIZED HEALTH CARE EDUCATION/TRAINING PROGRAM

## 2022-01-01 PROCEDURE — 82306 VITAMIN D 25 HYDROXY: CPT | Mod: ZL | Performed by: STUDENT IN AN ORGANIZED HEALTH CARE EDUCATION/TRAINING PROGRAM

## 2022-01-01 PROCEDURE — 80177 DRUG SCRN QUAN LEVETIRACETAM: CPT | Mod: ZL | Performed by: STUDENT IN AN ORGANIZED HEALTH CARE EDUCATION/TRAINING PROGRAM

## 2022-01-01 PROCEDURE — 99214 OFFICE O/P EST MOD 30 MIN: CPT | Mod: 25 | Performed by: STUDENT IN AN ORGANIZED HEALTH CARE EDUCATION/TRAINING PROGRAM

## 2022-01-01 PROCEDURE — 88305 TISSUE EXAM BY PATHOLOGIST: CPT

## 2022-01-01 PROCEDURE — 80185 ASSAY OF PHENYTOIN TOTAL: CPT | Mod: ZL | Performed by: STUDENT IN AN ORGANIZED HEALTH CARE EDUCATION/TRAINING PROGRAM

## 2022-01-01 PROCEDURE — 70553 MRI BRAIN STEM W/O & W/DYE: CPT

## 2022-01-01 PROCEDURE — 80053 COMPREHEN METABOLIC PANEL: CPT | Mod: ZL | Performed by: STUDENT IN AN ORGANIZED HEALTH CARE EDUCATION/TRAINING PROGRAM

## 2022-01-01 PROCEDURE — 250N000011 HC RX IP 250 OP 636: Performed by: NURSE ANESTHETIST, CERTIFIED REGISTERED

## 2022-01-01 PROCEDURE — U0003 INFECTIOUS AGENT DETECTION BY NUCLEIC ACID (DNA OR RNA); SEVERE ACUTE RESPIRATORY SYNDROME CORONAVIRUS 2 (SARS-COV-2) (CORONAVIRUS DISEASE [COVID-19]), AMPLIFIED PROBE TECHNIQUE, MAKING USE OF HIGH THROUGHPUT TECHNOLOGIES AS DESCRIBED BY CMS-2020-01-R: HCPCS | Mod: ZL

## 2022-01-01 PROCEDURE — G0463 HOSPITAL OUTPT CLINIC VISIT: HCPCS

## 2022-01-01 PROCEDURE — 45380 COLONOSCOPY AND BIOPSY: CPT | Performed by: SURGERY

## 2022-01-01 PROCEDURE — 255N000002 HC RX 255 OP 636: Performed by: RADIOLOGY

## 2022-01-01 PROCEDURE — 36415 COLL VENOUS BLD VENIPUNCTURE: CPT | Mod: ZL | Performed by: STUDENT IN AN ORGANIZED HEALTH CARE EDUCATION/TRAINING PROGRAM

## 2022-01-01 PROCEDURE — 88341 IMHCHEM/IMCYTCHM EA ADD ANTB: CPT

## 2022-01-01 PROCEDURE — 45385 COLONOSCOPY W/LESION REMOVAL: CPT | Performed by: NURSE ANESTHETIST, CERTIFIED REGISTERED

## 2022-01-01 PROCEDURE — 80161 ASY CARBAMAZEPIN 10,11-EPXID: CPT | Mod: ZL | Performed by: STUDENT IN AN ORGANIZED HEALTH CARE EDUCATION/TRAINING PROGRAM

## 2022-01-01 PROCEDURE — C9803 HOPD COVID-19 SPEC COLLECT: HCPCS

## 2022-01-01 PROCEDURE — A9575 INJ GADOTERATE MEGLUMI 0.1ML: HCPCS | Performed by: RADIOLOGY

## 2022-01-01 RX ORDER — LIDOCAINE HYDROCHLORIDE 20 MG/ML
INJECTION, SOLUTION INFILTRATION; PERINEURAL PRN
Status: DISCONTINUED | OUTPATIENT
Start: 2022-01-01 | End: 2022-01-01

## 2022-01-01 RX ORDER — PROPOFOL 10 MG/ML
INJECTION, EMULSION INTRAVENOUS PRN
Status: DISCONTINUED | OUTPATIENT
Start: 2022-01-01 | End: 2022-01-01

## 2022-01-01 RX ORDER — SIMETHICONE
LIQUID (ML) MISCELLANEOUS PRN
Status: DISCONTINUED | OUTPATIENT
Start: 2022-01-01 | End: 2022-01-01 | Stop reason: HOSPADM

## 2022-01-01 RX ORDER — SODIUM CHLORIDE, SODIUM LACTATE, POTASSIUM CHLORIDE, CALCIUM CHLORIDE 600; 310; 30; 20 MG/100ML; MG/100ML; MG/100ML; MG/100ML
INJECTION, SOLUTION INTRAVENOUS CONTINUOUS
Status: DISCONTINUED | OUTPATIENT
Start: 2022-01-01 | End: 2022-01-01 | Stop reason: HOSPADM

## 2022-01-01 RX ORDER — LIDOCAINE 40 MG/G
CREAM TOPICAL
Status: DISCONTINUED | OUTPATIENT
Start: 2022-01-01 | End: 2022-01-01 | Stop reason: HOSPADM

## 2022-01-01 RX ORDER — BISACODYL 5 MG
TABLET, DELAYED RELEASE (ENTERIC COATED) ORAL
Qty: 2 TABLET | Refills: 0 | Status: ON HOLD | OUTPATIENT
Start: 2022-01-01 | End: 2022-01-01

## 2022-01-01 RX ORDER — NALOXONE HYDROCHLORIDE 0.4 MG/ML
0.4 INJECTION, SOLUTION INTRAMUSCULAR; INTRAVENOUS; SUBCUTANEOUS
Status: DISCONTINUED | OUTPATIENT
Start: 2022-01-01 | End: 2022-01-01 | Stop reason: HOSPADM

## 2022-01-01 RX ORDER — CARBAMAZEPINE 200 MG/1
200 TABLET, EXTENDED RELEASE ORAL 2 TIMES DAILY
Qty: 120 TABLET | Refills: 3 | Status: SHIPPED | OUTPATIENT
Start: 2022-01-01 | End: 2023-01-01

## 2022-01-01 RX ORDER — POLYETHYLENE GLYCOL 3350, SODIUM CHLORIDE, SODIUM BICARBONATE, POTASSIUM CHLORIDE 420; 11.2; 5.72; 1.48 G/4L; G/4L; G/4L; G/4L
4000 POWDER, FOR SOLUTION ORAL ONCE
Qty: 4000 ML | Refills: 0 | Status: SHIPPED | OUTPATIENT
Start: 2022-01-01 | End: 2022-01-01

## 2022-01-01 RX ORDER — PHENYTOIN SODIUM 100 MG/1
200 CAPSULE, EXTENDED RELEASE ORAL 2 TIMES DAILY
Qty: 180 CAPSULE | Refills: 4 | Status: SHIPPED | OUTPATIENT
Start: 2022-01-01 | End: 2023-01-01

## 2022-01-01 RX ORDER — PROPOFOL 10 MG/ML
INJECTION, EMULSION INTRAVENOUS CONTINUOUS PRN
Status: DISCONTINUED | OUTPATIENT
Start: 2022-01-01 | End: 2022-01-01

## 2022-01-01 RX ORDER — NALOXONE HYDROCHLORIDE 0.4 MG/ML
0.2 INJECTION, SOLUTION INTRAMUSCULAR; INTRAVENOUS; SUBCUTANEOUS
Status: DISCONTINUED | OUTPATIENT
Start: 2022-01-01 | End: 2022-01-01 | Stop reason: HOSPADM

## 2022-01-01 RX ORDER — FLUMAZENIL 0.1 MG/ML
0.2 INJECTION, SOLUTION INTRAVENOUS
Status: DISCONTINUED | OUTPATIENT
Start: 2022-01-01 | End: 2022-01-01 | Stop reason: HOSPADM

## 2022-01-01 RX ADMIN — PROPOFOL 160 MCG/KG/MIN: 10 INJECTION, EMULSION INTRAVENOUS at 09:03

## 2022-01-01 RX ADMIN — PROPOFOL 80 MG: 10 INJECTION, EMULSION INTRAVENOUS at 09:03

## 2022-01-01 RX ADMIN — GADOTERATE MEGLUMINE 15 ML: 376.9 INJECTION INTRAVENOUS at 14:30

## 2022-01-01 RX ADMIN — SODIUM CHLORIDE, POTASSIUM CHLORIDE, SODIUM LACTATE AND CALCIUM CHLORIDE 30 ML/HR: 600; 310; 30; 20 INJECTION, SOLUTION INTRAVENOUS at 08:21

## 2022-01-01 RX ADMIN — LIDOCAINE HYDROCHLORIDE 60 MG: 20 INJECTION, SOLUTION INFILTRATION; PERINEURAL at 09:03

## 2022-01-01 ASSESSMENT — ACTIVITIES OF DAILY LIVING (ADL)
ADLS_ACUITY_SCORE: 35
ADLS_ACUITY_SCORE: 35

## 2022-01-01 ASSESSMENT — ENCOUNTER SYMPTOMS: SEIZURES: 1

## 2022-01-01 ASSESSMENT — PAIN SCALES - GENERAL: PAINLEVEL: NO PAIN (0)

## 2022-01-01 ASSESSMENT — LIFESTYLE VARIABLES: TOBACCO_USE: 1

## 2022-01-12 ENCOUNTER — IMMUNIZATION (OUTPATIENT)
Dept: FAMILY MEDICINE | Facility: OTHER | Age: 57
End: 2022-01-12
Attending: FAMILY MEDICINE
Payer: COMMERCIAL

## 2022-01-12 PROCEDURE — 0004A PR COVID VAC PFIZER DIL RECON 30 MCG/0.3 ML IM: CPT

## 2022-07-15 PROBLEM — Z87.898 HISTORY OF SEIZURE: Status: ACTIVE | Noted: 2022-01-01

## 2022-07-15 NOTE — PROGRESS NOTES
"      Sherrie Olsen is a 56 year old, presenting for the following health issues:  Hypertension (Est care)      HPI     Hypertension Follow-up      Do you check your blood pressure regularly outside of the clinic? No     Are you following a low salt diet? No    Are your blood pressures ever more than 140 on the top number (systolic) OR more   than 90 on the bottom number (diastolic), for example 140/90? No      How many servings of fruits and vegetables do you eat daily?  0-1    On average, how many sweetened beverages do you drink each day (Examples: soda, juice, sweet tea, etc.  Do NOT count diet or artificially sweetened beverages)?   2    How many days per week do you exercise enough to make your heart beat faster? 3 or less    How many minutes a day do you exercise enough to make your heart beat faster? 30 - 60    How many days per week do you miss taking your medication? 0    Doesn't drive due to a remote seizure 5-6 years ago and drove through garage.   He has been on phenytoin and carbamazepine.  He does not have a neurologist.  His former primary care physician in [place] has been prescribing his antiepileptics.  Last seizure was at least a few years ago.  He does not drive anymore due to seizures.    Blood pressure in good control today.  No medications for blood pressure      Current smoker.  Interested in quitting.  He would like to try nicotine gum.  He smokes approximately 10 cigarettes/day.      Review of Systems         Objective    /64 (BP Location: Right arm, Patient Position: Sitting, Cuff Size: Adult Regular)   Pulse 80   Temp 97.9  F (36.6  C) (Temporal)   Resp 16   Ht 1.867 m (6' 1.5\")   Wt 70.8 kg (156 lb)   SpO2 100%   BMI 20.30 kg/m    Body mass index is 20.3 kg/m .  Physical Exam   General: Pleasant 56-year-old man sitting clinic no acute distress  CV: Regular rate and rhythm no peripheral edema appreciated  Pulmonary: Clear to auscultation bilaterally  GI: Bowel sounds " present no organomegaly or abdominal tenderness  Skin: 2 cm scab behind the right ear no purulence able to be expressed.  No cellulitis or erythema        Assessment & Plan       ICD-10-CM    1. History of seizure  Z87.898 carBAMazepine (TEGRETOL XR) 200 MG 12 hr tablet     Adult Neurology  Referral     phenytoin (DILANTIN) 100 MG capsule   2. Encounter for smoking cessation counseling  Z71.6 nicotine (NICORETTE) 2 MG gum   3. Tobacco use disorder  F17.200 nicotine (NICORETTE) 2 MG gum   4. Encounter for screening colonoscopy  Z12.11 Colonscopy Screening  Referral     History of seizure: Sounds like absence seizure's Versus Partial Seizures.  Has not driven in 5 or 6 years due to driving through his garage.  He is on carbamazepine and phenytoin.  These were refilled today.  Will refer to neurology for review his antiepileptics  and seizure history.    Tobacco use disorder: Patient smoking approximately 10 cigarettes/day counseled on smoking cessation 3 minutes spent.  He would like to start nicotine gum.    Screening colonoscopy: Has not had a screening colonoscopy this was ordered        Follow-up this fall or winter for an annual exam and more Healthcare maintenance.  Repeat labs including basic labs and lipid panel at that time.    No follow-ups on file.    Yang Mackey MD  [facility]              .  ..

## 2022-07-15 NOTE — NURSING NOTE
"Chief Complaint   Patient presents with     Hypertension     Est care       Medication reconciliation completed.    FOOD SECURITY SCREENING QUESTIONS:    The next two questions are to help us understand your food security.  If you are feeling you need any assistance in this area, we have resources available to support you today.    Hunger Vital Signs:  Within the past 12 months we worried whether our food would run out before we got money to buy more. Never  Within the past 12 months the food we bought just didn't last and we didn't have money to get more. Never    Initial /64 (BP Location: Right arm, Patient Position: Sitting, Cuff Size: Adult Regular)   Pulse 80   Temp 97.9  F (36.6  C) (Temporal)   Resp 16   Ht 1.867 m (6' 1.5\")   Wt 70.8 kg (156 lb)   SpO2 100%   BMI 20.30 kg/m   Estimated body mass index is 20.3 kg/m  as calculated from the following:    Height as of this encounter: 1.867 m (6' 1.5\").    Weight as of this encounter: 70.8 kg (156 lb).       Marie Melendez LPN .......  7/15/2022  1:21 PM  "

## 2022-08-12 NOTE — TELEPHONE ENCOUNTER
Screening Questions for the Scheduling of Screening Colonoscopies   (If Colonoscopy is diagnostic, Provider should review the chart before scheduling.)  Are you younger than 50 or older than 80?  NO  Do you take aspirin or fish oil?  NO (if yes, tell patient to stop 1 week prior to Colonoscopy)  Do you take warfarin (Coumadin), clopidogrel (Plavix), apixaban (Eliquis), dabigatram (Pradaxa), rivaroxaban (Xarelto) or any blood thinner? NO  Do you use oxygen at home?  NO  Do you have kidney disease? NO  Are you on dialysis? NO  Have you had a stroke or heart attack in the last year? NO  Have you had a stent in your heart or any blood vessel in the last year? NO  Have you had a transplant of any organ? NO  Have you had a colonoscopy or upper endoscopy (EGD) before? NO         When?  N/A  Date of scheduled Colonoscopy. 09/13/2022  Provider [facility]  Pharm[facility]

## 2022-09-10 NOTE — LETTER
September 12, 2022      Raúl Adams     [place] [address]        Dear ,    We are writing to inform you of your test results.    Your COVID testing was negative.    Resulted Orders   Asymptomatic COVID-19 Virus (Coronavirus) by PCR Nose   Result Value Ref Range    SARS CoV2 PCR Negative Negative      Comment:      NEGATIVE: SARS-CoV-2 (COVID-19) RNA not detected, presumed negative.    Narrative    Testing was performed using the [employer] SARS-CoV-2 Assay on the  [employer] Instrument System. Additional information about this  Emergency Use Authorization (EUA) assay can be found via the Lab  Guide. This test should be ordered for the detection of SARS-CoV-2 in  individuals who meet SARS-CoV-2 clinical and/or epidemiological  criteria. Test performance is unknown in asymptomatic patients. This  test is for in vitro diagnostic use under the FDA EUA for  laboratories certified under CLIA to perform high complexity testing.  This test has not been FDA cleared or approved. A negative result  does not rule out the presence of PCR inhibitors in the specimen or  target RNA in concentration below the limit of detection for the  assay. The possibility of a false negative should be considered if  the patient's recent exposure or clinical presentation suggests  COVID-19. This test was validated by the [facility]. This laboratory is certified under  the Clinical Laboratory Improvement Amendments of 1988 (CLIA-88) as  qualified to perform high complexity laboratory testing.       If you have any questions or concerns, please call the clinic at the number listed above.       Sincerely,      Yang Mackey MD

## 2022-09-13 NOTE — H&P
GENERAL SURGERY CONSULTATION NOTE    Raúl Adams   PO   [facility] [address]  57 year old  male    Primary Care Provider:  Yang Mackey      HPI: Raúl Adams presents to day surgery in need of colonoscopy for screening for colon caner.   Raúl Adams denies family history of colon cancer. Patient denies change in bowel habits or blood in stools. Previous colonoscopy was never.     REVIEW OF SYSTEMS:    GENERAL: No fevers or chills. Denies fatigue, recent weight loss.  HEENT: No sinus drainage. No changes with vision or hearing. No difficulty swallowing.   LYMPHATICS:  Noswollen nodes in axilla, neck or groin.  CARDIOVASCULAR: Denies chest pain, palpitations and dyspnea on exertion.  PULMONARY: No shortness of breath or cough. No increase in sputum production.  GI: Denies melena,bright red blood in stools. No hematemesis. No constipation or diarrhea.  : No dysuria or hematuria.  SKIN: No recent rashes or ulcers.   HEMATOLOGY:  No history of easy bruising or bleeding.  ENDOCRINE:  No history of diabetes or thyroid problems.  NEUROLOGY:  No history of seizures or headaches. No motor or sensory changes.        Patient Active Problem List   Diagnosis     History of seizure       Past Medical History:   Diagnosis Date     Seizures (H)        History reviewed. No pertinent surgical history.    Family History   Problem Relation Age of Onset     Coronary Artery Disease Father        Social History     Social History Narrative    Lives in [place].     Worked for the City.     Likes watching the twins.        Social History     Socioeconomic History     Marital status: Single     Spouse name: Not on file     Number of children: Not on file     Years of education: Not on file     Highest education level: Not on file   Occupational History     Not on file   Tobacco Use     Smoking status: Current Every Day Smoker     Packs/day: 1.00     Smokeless tobacco: Never Used   Vaping Use     Vaping Use: Never used  "  Substance and Sexual Activity     Alcohol use: Yes     Drug use: Not Currently     Sexual activity: Not Currently   Other Topics Concern     Not on file   Social History Narrative    Lives in [place].     Worked for the City.     Likes watching the twins.      Social Determinants of Health     Financial Resource Strain: Not on file   Food Insecurity: Not on file   Transportation Needs: Not on file   Physical Activity: Not on file   Stress: Not on file   Social Connections: Not on file   Intimate Partner Violence: Not on file   Housing Stability: Not on file       No current facility-administered medications on file prior to encounter.  carBAMazepine (TEGRETOL XR) 200 MG 12 hr tablet, Take 1 tablet (200 mg) by mouth 2 times daily  nicotine (NICORETTE) 2 MG gum, Place 1 each (2 mg) inside cheek every hour as needed for smoking cessation  phenytoin (DILANTIN) 100 MG capsule, Take 2 capsules (200 mg) by mouth 2 times daily          ALLERGIES/SENSITIVITIES: No Known Allergies    PHYSICAL EXAM:     /80   Pulse 83   Temp 97.1  F (36.2  C) (Tympanic)   Ht 1.892 m (6' 2.5\")   Wt 71.7 kg (158 lb)   SpO2 99%   BMI 20.01 kg/m      General Appearance:   Sitting up in bed, no apparent distress  HEENT: Pupils are equal and reactive, no scleral icterus   Heart & CV:  RRR, no murmur.  LUNGS: No increased work of breathing. Lungs are CTA B/L, no wheezing or crackles.  Abd:  soft, non-tender, no masses   Ext: no lower extremity edema   Neuro: alert and oriented, normal speech and mentation         CONSULTATION ASSESSMENT AND PLAN:    57 year old male with average risk for colon cancer.      The technical details of colonoscopy were discussed with the patient along with the risks and benefits to include bleeding, perforation and incomplete study. Raúl Adams demonstrated understanding and is willing to proceed.       Virgil Marcum MD on 9/13/2022 at 8:57 AM      "

## 2022-09-13 NOTE — ANESTHESIA CARE TRANSFER NOTE
Patient: Raúl Adams    Procedure: Procedure(s):  COLONOSCOPY, WITH POLYPECTOMY       Diagnosis: Encounter for screening colonoscopy [Z12.11]  Diagnosis Additional Information: No value filed.    Anesthesia Type:   MAC     Note:    Oropharynx: oropharynx clear of all foreign objects  Level of Consciousness: drowsy  Oxygen Supplementation: nasal cannula  Level of Supplemental Oxygen (L/min / FiO2): 3  Independent Airway: airway patency satisfactory and stable  Dentition: dentition unchanged  Vital Signs Stable: post-procedure vital signs reviewed and stable  Report to RN Given: handoff report given  Patient transferred to: Phase II    Handoff Report: Identifed the Patient, Identified the Reponsible Provider, Reviewed the pertinent medical history, Discussed the surgical course, Reviewed Intra-OP anesthesia mangement and issues during anesthesia, Set expectations for post-procedure period and Allowed opportunity for questions and acknowledgement of understanding      Vitals:  Vitals Value Taken Time   BP     Temp     Pulse     Resp     SpO2         Electronically Signed By: CASIMIRO Reeves CRNA  September 13, 2022  9:40 AM

## 2022-09-13 NOTE — DISCHARGE INSTRUCTIONS
[facility]  Adult Discharge Orders & Instructions    ________________________________________________________________          For 12 hours after surgery  Get plenty of rest.  A responsible adult must stay with you for at least 12 hours after you leave the hospital.   You may feel lightheaded.  IF so, sit for a few minutes before standing.  Have someone help you get up.   You may have a slight fever. Call the doctor if your fever is over 101 F (38.3 C) (taken under the tongue) or lasts longer than 24 hours.  You may have a dry mouth, a sore throat, muscle aches or trouble sleeping.  These should go away after 24 hours.  Do not make important or legal decisions.  6.   Do not drive or use heavy equipment.  If you have weakness or tingling, don't drive or use heavy equipment until this feeling goes away.    To contact a doctor, call   [phone]_______________________

## 2022-09-13 NOTE — ANESTHESIA PREPROCEDURE EVALUATION
Anesthesia Pre-Procedure Evaluation    Patient: Raúl Adams   MRN: [id number] : 1965        Procedure : Procedure(s):  COLONOSCOPY          Past Medical History:   Diagnosis Date     Seizures (H)       History reviewed. No pertinent surgical history.   No Known Allergies   Social History     Tobacco Use     Smoking status: Current Every Day Smoker     Packs/day: 1.00     Smokeless tobacco: Never Used   Substance Use Topics     Alcohol use: Yes      Wt Readings from Last 1 Encounters:   22 71.7 kg (158 lb)        Anesthesia Evaluation   Pt has not had prior anesthetic         ROS/MED HX  ENT/Pulmonary:     (+) tobacco use, Current use,     Neurologic:     (+) seizures,     Cardiovascular:  - neg cardiovascular ROS     METS/Exercise Tolerance: >4 METS    Hematologic:  - neg hematologic  ROS     Musculoskeletal:  - neg musculoskeletal ROS     GI/Hepatic:     (+) bowel prep,     Renal/Genitourinary:  - neg Renal ROS     Endo:  - neg endo ROS     Psychiatric/Substance Use:  - neg psychiatric ROS     Infectious Disease:  - neg infectious disease ROS     Malignancy:  - neg malignancy ROS     Other:  - neg other ROS          Physical Exam    Airway        Mallampati: II   TM distance: > 3 FB   Neck ROM: full   Mouth opening: > 3 cm    Respiratory Devices and Support         Dental  no notable dental history         Cardiovascular   cardiovascular exam normal       Rhythm and rate: regular and normal     Pulmonary   pulmonary exam normal        breath sounds clear to auscultation           OUTSIDE LABS:  CBC: No results found for: WBC, HGB, HCT, PLT  BMP: No results found for: NA, POTASSIUM, CHLORIDE, CO2, BUN, CR, GLC  COAGS: No results found for: PTT, INR, FIBR  POC: No results found for: BGM, HCG, HCGS  HEPATIC: No results found for: ALBUMIN, PROTTOTAL, ALT, AST, GGT, ALKPHOS, BILITOTAL, BILIDIRECT, CAL  OTHER: No results found for: PH, LACT, A1C, SUZAN, PHOS, MAG, LIPASE, AMYLASE, TSH, T4, T3, CRP,  SED    Anesthesia Plan    ASA Status:  2   NPO Status:  NPO Appropriate    Anesthesia Type: MAC.              Consents    Anesthesia Plan(s) and associated risks, benefits, and realistic alternatives discussed. Questions answered and patient/representative(s) expressed understanding.     - Discussed: Risks, Benefits and Alternatives for BOTH SEDATION and the PROCEDURE were discussed     - Discussed with:  Patient      - Extended Intubation/Ventilatory Support Discussed: No.      - Patient is DNR/DNI Status: No    Use of blood products discussed: No .     Postoperative Care            Comments:                CASIMIRO Napier CRNA

## 2022-09-13 NOTE — OP NOTE
COLONOSCOPY PROCEDURE NOTE    DATE OF SERVICE: 9/13/2022    SURGEON: BLANCA Marcum MD     PRE-OP DIAGNOSIS:    Healthcare maintenance     POST-OP DIAGNOSIS:    Polyps at ascending colon, transverse colon , sigmoid colon     PROCEDURE:   Colonoscopy with snare polypectomy    ASSISTANT:  Circulator: Nancy Andrade RN  Scrub Person: Billie Griffith    ANESTHESIA:  MAC                            Monitor Anesthesia CareCRNA Independent: Loki Medrano APRN CRNA    INDICATION FOR THE PROCEDURE: The patient is a 57 year old male with average risk for colon cancer. The patient has no other complaints.  After explaining the risks to include bleeding, perforation, potential inability to reach the cecum the patient wishes to proceed.    PROCEDURE: After adequate sedation, the patient was in the left lateral decubitus position.  Rectal exam was performed.  There was normal tone and no palpable masses.  The colonoscope was introduced into the rectum and advanced to the cecum with Mild difficulty.  The patient's prep was good.  The terminal cecum was reached.  The cecum, ascending, transverse, descending and sigmoid colon were significant for one 8mm polyp in the ascending colon removed with hot snare, one 6mm polyp in the ascending colon removed with hot snare, one 5mm polyp in the transverse colon removed with hot snare and one 3 mm polyp in the proximal sigmoid colon removed with hot snare. .  The scope was retroflexed in the rectum.  The anorectal junction was unremarkable.  The scope was straightened and removed.  The patient tolerated the procedure well.     ESTIMATED BLOOD LOSS: none    COMPLICATIONS:  None    TISSUE REMOVED:  Yes    RECOMMEND:    Follow-up pending pathology        BLANCA Marcum MD

## 2022-09-13 NOTE — ANESTHESIA POSTPROCEDURE EVALUATION
Patient: Raúl Adams    Procedure: Procedure(s):  COLONOSCOPY, WITH POLYPECTOMY       Anesthesia Type:  MAC    Note:  Disposition: Outpatient   Postop Pain Control: Uneventful            Sign Out: Well controlled pain   PONV: No   Neuro/Psych: Uneventful            Sign Out: Acceptable/Baseline neuro status   Airway/Respiratory: Uneventful            Sign Out: Acceptable/Baseline resp. status   CV/Hemodynamics: Uneventful            Sign Out: Acceptable CV status   Other NRE: NONE   DID A NON-ROUTINE EVENT OCCUR? No           Last vitals:  Vitals Value Taken Time   /55 09/13/22 1000   Temp 97.4  F (36.3  C) 09/13/22 0945   Pulse 61 09/13/22 1000   Resp 16 09/13/22 0945   SpO2 100 % 09/13/22 0956   Vitals shown include unvalidated device data.    Electronically Signed By: CASIMIRO Reeves CRNA  September 13, 2022  11:17 AM

## 2023-01-01 ENCOUNTER — PATIENT OUTREACH (OUTPATIENT)
Dept: CARE COORDINATION | Facility: CLINIC | Age: 58
End: 2023-01-01
Payer: COMMERCIAL

## 2023-01-01 ENCOUNTER — APPOINTMENT (OUTPATIENT)
Dept: GENERAL RADIOLOGY | Facility: OTHER | Age: 58
End: 2023-01-01
Attending: FAMILY MEDICINE
Payer: COMMERCIAL

## 2023-01-01 ENCOUNTER — APPOINTMENT (OUTPATIENT)
Dept: CT IMAGING | Facility: OTHER | Age: 58
End: 2023-01-01
Attending: EMERGENCY MEDICINE
Payer: COMMERCIAL

## 2023-01-01 ENCOUNTER — HOSPITAL ENCOUNTER (OUTPATIENT)
Dept: GENERAL RADIOLOGY | Facility: OTHER | Age: 58
Discharge: HOME | End: 2023-04-24
Attending: ORTHOPAEDIC SURGERY
Payer: COMMERCIAL

## 2023-01-01 ENCOUNTER — TELEPHONE (OUTPATIENT)
Dept: INTERNAL MEDICINE | Facility: OTHER | Age: 58
End: 2023-01-01
Payer: COMMERCIAL

## 2023-01-01 ENCOUNTER — APPOINTMENT (OUTPATIENT)
Dept: CT IMAGING | Facility: OTHER | Age: 58
End: 2023-01-01
Attending: INTERNAL MEDICINE
Payer: COMMERCIAL

## 2023-01-01 ENCOUNTER — OFFICE VISIT (OUTPATIENT)
Dept: INTERNAL MEDICINE | Facility: OTHER | Age: 58
End: 2023-01-01
Attending: STUDENT IN AN ORGANIZED HEALTH CARE EDUCATION/TRAINING PROGRAM
Payer: COMMERCIAL

## 2023-01-01 ENCOUNTER — OFFICE VISIT (OUTPATIENT)
Dept: ORTHOPEDICS | Facility: OTHER | Age: 58
End: 2023-01-01
Attending: ORTHOPAEDIC SURGERY
Payer: COMMERCIAL

## 2023-01-01 ENCOUNTER — HOSPITAL ENCOUNTER (EMERGENCY)
Facility: OTHER | Age: 58
Discharge: ANOTHER HOSPITAL | End: 2023-05-16
Attending: EMERGENCY MEDICINE | Admitting: EMERGENCY MEDICINE
Payer: COMMERCIAL

## 2023-01-01 ENCOUNTER — HOSPITAL ENCOUNTER (EMERGENCY)
Facility: OTHER | Age: 58
Discharge: HOME | End: 2023-04-08
Attending: FAMILY MEDICINE | Admitting: FAMILY MEDICINE
Payer: COMMERCIAL

## 2023-01-01 ENCOUNTER — HOSPITAL ENCOUNTER (OUTPATIENT)
Dept: GENERAL RADIOLOGY | Facility: OTHER | Age: 58
Discharge: HOME | End: 2023-06-19
Attending: ORTHOPAEDIC SURGERY
Payer: COMMERCIAL

## 2023-01-01 ENCOUNTER — HOSPITAL ENCOUNTER (EMERGENCY)
Facility: OTHER | Age: 58
Discharge: ANOTHER HOSPITAL | End: 2023-06-24
Attending: INTERNAL MEDICINE | Admitting: INTERNAL MEDICINE
Payer: COMMERCIAL

## 2023-01-01 ENCOUNTER — APPOINTMENT (OUTPATIENT)
Dept: CT IMAGING | Facility: OTHER | Age: 58
End: 2023-01-01
Attending: FAMILY MEDICINE
Payer: COMMERCIAL

## 2023-01-01 ENCOUNTER — HOSPITAL ENCOUNTER (OUTPATIENT)
Dept: GENERAL RADIOLOGY | Facility: OTHER | Age: 58
Discharge: HOME | End: 2023-05-15
Attending: ORTHOPAEDIC SURGERY
Payer: COMMERCIAL

## 2023-01-01 VITALS
BODY MASS INDEX: 18.9 KG/M2 | RESPIRATION RATE: 20 BRPM | TEMPERATURE: 98.4 F | HEART RATE: 84 BPM | OXYGEN SATURATION: 100 % | DIASTOLIC BLOOD PRESSURE: 88 MMHG | SYSTOLIC BLOOD PRESSURE: 136 MMHG | WEIGHT: 151.25 LBS

## 2023-01-01 VITALS
TEMPERATURE: 97.9 F | SYSTOLIC BLOOD PRESSURE: 136 MMHG | RESPIRATION RATE: 16 BRPM | WEIGHT: 159.2 LBS | HEART RATE: 91 BPM | BODY MASS INDEX: 20.17 KG/M2 | OXYGEN SATURATION: 100 % | DIASTOLIC BLOOD PRESSURE: 72 MMHG

## 2023-01-01 VITALS
SYSTOLIC BLOOD PRESSURE: 157 MMHG | RESPIRATION RATE: 25 BRPM | HEART RATE: 113 BPM | WEIGHT: 143 LBS | DIASTOLIC BLOOD PRESSURE: 83 MMHG | BODY MASS INDEX: 17.87 KG/M2 | OXYGEN SATURATION: 95 %

## 2023-01-01 VITALS
SYSTOLIC BLOOD PRESSURE: 141 MMHG | RESPIRATION RATE: 20 BRPM | HEART RATE: 77 BPM | BODY MASS INDEX: 20.01 KG/M2 | OXYGEN SATURATION: 95 % | TEMPERATURE: 96.8 F | WEIGHT: 158 LBS | DIASTOLIC BLOOD PRESSURE: 79 MMHG

## 2023-01-01 VITALS
BODY MASS INDEX: 19.15 KG/M2 | TEMPERATURE: 98.4 F | DIASTOLIC BLOOD PRESSURE: 92 MMHG | HEIGHT: 75 IN | WEIGHT: 154 LBS | SYSTOLIC BLOOD PRESSURE: 151 MMHG | RESPIRATION RATE: 15 BRPM | OXYGEN SATURATION: 99 % | HEART RATE: 71 BPM

## 2023-01-01 VITALS — OXYGEN SATURATION: 94 % | HEART RATE: 55 BPM

## 2023-01-01 DIAGNOSIS — I63.9 RECURRENT STROKES (H): ICD-10-CM

## 2023-01-01 DIAGNOSIS — S42.032A CLOSED DISPLACED FRACTURE OF ACROMIAL END OF LEFT CLAVICLE, INITIAL ENCOUNTER: Primary | ICD-10-CM

## 2023-01-01 DIAGNOSIS — I63.9 CEREBROVASCULAR ACCIDENT (CVA), UNSPECIFIED MECHANISM (H): ICD-10-CM

## 2023-01-01 DIAGNOSIS — Z87.898 HISTORY OF SEIZURE: Primary | ICD-10-CM

## 2023-01-01 DIAGNOSIS — E78.2 MIXED HYPERLIPIDEMIA: ICD-10-CM

## 2023-01-01 DIAGNOSIS — I63.511 ACUTE ISCHEMIC RIGHT MCA STROKE (H): ICD-10-CM

## 2023-01-01 DIAGNOSIS — Z09 HOSPITAL DISCHARGE FOLLOW-UP: Primary | ICD-10-CM

## 2023-01-01 DIAGNOSIS — Z87.898 HISTORY OF SEIZURE: ICD-10-CM

## 2023-01-01 DIAGNOSIS — F17.200 TOBACCO USE DISORDER: ICD-10-CM

## 2023-01-01 DIAGNOSIS — S42.032A CLOSED DISPLACED FRACTURE OF ACROMIAL END OF LEFT CLAVICLE, INITIAL ENCOUNTER: ICD-10-CM

## 2023-01-01 DIAGNOSIS — I63.512 ACUTE ISCHEMIC LEFT MCA STROKE (H): Primary | ICD-10-CM

## 2023-01-01 DIAGNOSIS — I66.22 OCCLUSION OF LEFT POSTERIOR CEREBRAL ARTERY: ICD-10-CM

## 2023-01-01 DIAGNOSIS — E87.1 HYPONATREMIA: ICD-10-CM
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
## 2023-01-01 LAB
ALBUMIN SERPL BCG-MCNC: 3.9 G/DL (ref 3.5–5.2)
ALBUMIN SERPL BCG-MCNC: 4.4 G/DL (ref 3.5–5.2)
ALBUMIN SERPL BCG-MCNC: 4.5 G/DL (ref 3.5–5.2)
ALP SERPL-CCNC: 196 U/L (ref 40–129)
ALP SERPL-CCNC: 235 U/L (ref 40–129)
ALP SERPL-CCNC: 238 U/L (ref 40–129)
ALT SERPL W P-5'-P-CCNC: 22 U/L (ref 10–50)
ALT SERPL W P-5'-P-CCNC: 24 U/L (ref 10–50)
ALT SERPL W P-5'-P-CCNC: 29 U/L (ref 10–50)
ANION GAP SERPL CALCULATED.3IONS-SCNC: 10 MMOL/L (ref 7–15)
ANION GAP SERPL CALCULATED.3IONS-SCNC: 11 MMOL/L (ref 7–15)
ANION GAP SERPL CALCULATED.3IONS-SCNC: 12 MMOL/L (ref 7–15)
ANION GAP SERPL CALCULATED.3IONS-SCNC: 12 MMOL/L (ref 7–15)
APTT PPP: 26 SECONDS (ref 22–38)
APTT PPP: 29 SECONDS (ref 22–38)
APTT PPP: 31 SECONDS (ref 22–38)
AST SERPL W P-5'-P-CCNC: 29 U/L (ref 10–50)
AST SERPL W P-5'-P-CCNC: 34 U/L (ref 10–50)
AST SERPL W P-5'-P-CCNC: 36 U/L (ref 10–50)
ATRIAL RATE - MUSE: 102 BPM
ATRIAL RATE - MUSE: 81 BPM
BASOPHILS # BLD AUTO: 0 10E3/UL (ref 0–0.2)
BASOPHILS NFR BLD AUTO: 0 %
BASOPHILS NFR BLD AUTO: 0 %
BASOPHILS NFR BLD AUTO: 1 %
BILIRUB SERPL-MCNC: 0.4 MG/DL
BILIRUB SERPL-MCNC: 0.5 MG/DL
BILIRUB SERPL-MCNC: 0.5 MG/DL
BUN SERPL-MCNC: 6.6 MG/DL (ref 6–20)
BUN SERPL-MCNC: 7.5 MG/DL (ref 6–20)
BUN SERPL-MCNC: 9.2 MG/DL (ref 6–20)
BUN SERPL-MCNC: 9.9 MG/DL (ref 6–20)
CALCIUM SERPL-MCNC: 8.8 MG/DL (ref 8.6–10)
CALCIUM SERPL-MCNC: 8.9 MG/DL (ref 8.6–10)
CALCIUM SERPL-MCNC: 9 MG/DL (ref 8.6–10)
CALCIUM SERPL-MCNC: 9.8 MG/DL (ref 8.6–10)
CARBAMAZEPINE SERPL-MCNC: 7 UG/ML (ref 4–12)
CHLORIDE SERPL-SCNC: 89 MMOL/L (ref 98–107)
CHLORIDE SERPL-SCNC: 91 MMOL/L (ref 98–107)
CHLORIDE SERPL-SCNC: 94 MMOL/L (ref 98–107)
CHLORIDE SERPL-SCNC: 98 MMOL/L (ref 98–107)
CREAT SERPL-MCNC: 0.57 MG/DL (ref 0.67–1.17)
CREAT SERPL-MCNC: 0.58 MG/DL (ref 0.67–1.17)
CREAT SERPL-MCNC: 0.6 MG/DL (ref 0.67–1.17)
CREAT SERPL-MCNC: 0.68 MG/DL (ref 0.67–1.17)
DEPRECATED HCO3 PLAS-SCNC: 22 MMOL/L (ref 22–29)
DEPRECATED HCO3 PLAS-SCNC: 23 MMOL/L (ref 22–29)
DEPRECATED HCO3 PLAS-SCNC: 23 MMOL/L (ref 22–29)
DEPRECATED HCO3 PLAS-SCNC: 24 MMOL/L (ref 22–29)
DIASTOLIC BLOOD PRESSURE - MUSE: NORMAL MMHG
DIASTOLIC BLOOD PRESSURE - MUSE: NORMAL MMHG
EOSINOPHIL # BLD AUTO: 0 10E3/UL (ref 0–0.7)
EOSINOPHIL # BLD AUTO: 0.1 10E3/UL (ref 0–0.7)
EOSINOPHIL # BLD AUTO: 0.1 10E3/UL (ref 0–0.7)
EOSINOPHIL NFR BLD AUTO: 1 %
ERYTHROCYTE [DISTWIDTH] IN BLOOD BY AUTOMATED COUNT: 12 % (ref 10–15)
ERYTHROCYTE [DISTWIDTH] IN BLOOD BY AUTOMATED COUNT: 13.1 % (ref 10–15)
ERYTHROCYTE [DISTWIDTH] IN BLOOD BY AUTOMATED COUNT: 13.2 % (ref 10–15)
ETHANOL SERPL-MCNC: <0.01 G/DL
GFR SERPL CREATININE-BSD FRML MDRD: >90 ML/MIN/1.73M2
GLUCOSE BLDC GLUCOMTR-MCNC: 133 MG/DL (ref 70–99)
GLUCOSE SERPL-MCNC: 102 MG/DL (ref 70–99)
GLUCOSE SERPL-MCNC: 124 MG/DL (ref 70–99)
GLUCOSE SERPL-MCNC: 128 MG/DL (ref 70–99)
GLUCOSE SERPL-MCNC: 97 MG/DL (ref 70–99)
HCT VFR BLD AUTO: 33.6 % (ref 40–53)
HCT VFR BLD AUTO: 34.9 % (ref 40–53)
HCT VFR BLD AUTO: 35.7 % (ref 40–53)
HGB BLD-MCNC: 11.5 G/DL (ref 13.3–17.7)
HGB BLD-MCNC: 12.7 G/DL (ref 13.3–17.7)
HGB BLD-MCNC: 13 G/DL (ref 13.3–17.7)
HOLD SPECIMEN: NORMAL
IMM GRANULOCYTES # BLD: 0 10E3/UL
IMM GRANULOCYTES NFR BLD: 0 %
INR PPP: 1.03 (ref 0.85–1.15)
INR PPP: 1.05 (ref 0.85–1.15)
INR PPP: 1.42 (ref 0.85–1.15)
INTERPRETATION ECG - MUSE: NORMAL
INTERPRETATION ECG - MUSE: NORMAL
LYMPHOCYTES # BLD AUTO: 0.7 10E3/UL (ref 0.8–5.3)
LYMPHOCYTES # BLD AUTO: 0.9 10E3/UL (ref 0.8–5.3)
LYMPHOCYTES # BLD AUTO: 1.1 10E3/UL (ref 0.8–5.3)
LYMPHOCYTES NFR BLD AUTO: 10 %
LYMPHOCYTES NFR BLD AUTO: 14 %
LYMPHOCYTES NFR BLD AUTO: 24 %
MCH RBC QN AUTO: 31.9 PG (ref 26.5–33)
MCH RBC QN AUTO: 34.1 PG (ref 26.5–33)
MCH RBC QN AUTO: 34.2 PG (ref 26.5–33)
MCHC RBC AUTO-ENTMCNC: 34.2 G/DL (ref 31.5–36.5)
MCHC RBC AUTO-ENTMCNC: 36.4 G/DL (ref 31.5–36.5)
MCHC RBC AUTO-ENTMCNC: 36.4 G/DL (ref 31.5–36.5)
MCV RBC AUTO: 93 FL (ref 78–100)
MCV RBC AUTO: 94 FL (ref 78–100)
MCV RBC AUTO: 94 FL (ref 78–100)
MONOCYTES # BLD AUTO: 0.5 10E3/UL (ref 0–1.3)
MONOCYTES # BLD AUTO: 0.6 10E3/UL (ref 0–1.3)
MONOCYTES # BLD AUTO: 0.8 10E3/UL (ref 0–1.3)
MONOCYTES NFR BLD AUTO: 10 %
MONOCYTES NFR BLD AUTO: 11 %
MONOCYTES NFR BLD AUTO: 12 %
NEUTROPHILS # BLD AUTO: 3 10E3/UL (ref 1.6–8.3)
NEUTROPHILS # BLD AUTO: 4.7 10E3/UL (ref 1.6–8.3)
NEUTROPHILS # BLD AUTO: 5.4 10E3/UL (ref 1.6–8.3)
NEUTROPHILS NFR BLD AUTO: 64 %
NEUTROPHILS NFR BLD AUTO: 74 %
NEUTROPHILS NFR BLD AUTO: 77 %
NRBC # BLD AUTO: 0 10E3/UL
NRBC BLD AUTO-RTO: 0 /100
OSMOLALITY SERPL: 286 MMOL/KG (ref 275–295)
P AXIS - MUSE: 65 DEGREES
P AXIS - MUSE: 66 DEGREES
PHENYTOIN FREE SERPL-MCNC: 1 UG/ML
PLATELET # BLD AUTO: 194 10E3/UL (ref 150–450)
PLATELET # BLD AUTO: 293 10E3/UL (ref 150–450)
PLATELET # BLD AUTO: 297 10E3/UL (ref 150–450)
POTASSIUM SERPL-SCNC: 3.9 MMOL/L (ref 3.4–5.3)
POTASSIUM SERPL-SCNC: 4.1 MMOL/L (ref 3.4–5.3)
POTASSIUM SERPL-SCNC: 4.1 MMOL/L (ref 3.4–5.3)
POTASSIUM SERPL-SCNC: 4.2 MMOL/L (ref 3.4–5.3)
PR INTERVAL - MUSE: 144 MS
PR INTERVAL - MUSE: 188 MS
PROT SERPL-MCNC: 7.7 G/DL (ref 6.4–8.3)
PROT SERPL-MCNC: 8 G/DL (ref 6.4–8.3)
PROT SERPL-MCNC: 8.4 G/DL (ref 6.4–8.3)
QRS DURATION - MUSE: 78 MS
QRS DURATION - MUSE: 78 MS
QT - MUSE: 348 MS
QT - MUSE: 374 MS
QTC - MUSE: 434 MS
QTC - MUSE: 453 MS
R AXIS - MUSE: 40 DEGREES
R AXIS - MUSE: 56 DEGREES
RBC # BLD AUTO: 3.6 10E6/UL (ref 4.4–5.9)
RBC # BLD AUTO: 3.72 10E6/UL (ref 4.4–5.9)
RBC # BLD AUTO: 3.8 10E6/UL (ref 4.4–5.9)
SODIUM SERPL-SCNC: 123 MMOL/L (ref 136–145)
SODIUM SERPL-SCNC: 126 MMOL/L (ref 136–145)
SODIUM SERPL-SCNC: 128 MMOL/L (ref 136–145)
SODIUM SERPL-SCNC: 132 MMOL/L (ref 136–145)
SYSTOLIC BLOOD PRESSURE - MUSE: NORMAL MMHG
SYSTOLIC BLOOD PRESSURE - MUSE: NORMAL MMHG
T AXIS - MUSE: 49 DEGREES
T AXIS - MUSE: 60 DEGREES
TROPONIN T SERPL HS-MCNC: 158 NG/L
TROPONIN T SERPL HS-MCNC: <6 NG/L
VENTRICULAR RATE- MUSE: 102 BPM
VENTRICULAR RATE- MUSE: 81 BPM
WBC # BLD AUTO: 4.7 10E3/UL (ref 4–11)
WBC # BLD AUTO: 6.4 10E3/UL (ref 4–11)
WBC # BLD AUTO: 7 10E3/UL (ref 4–11)

## 2023-01-01 PROCEDURE — 85025 COMPLETE CBC W/AUTO DIFF WBC: CPT | Performed by: INTERNAL MEDICINE

## 2023-01-01 PROCEDURE — 93005 ELECTROCARDIOGRAM TRACING: CPT | Performed by: EMERGENCY MEDICINE

## 2023-01-01 PROCEDURE — 250N000011 HC RX IP 250 OP 636: Performed by: EMERGENCY MEDICINE

## 2023-01-01 PROCEDURE — 85730 THROMBOPLASTIN TIME PARTIAL: CPT | Performed by: INTERNAL MEDICINE

## 2023-01-01 PROCEDURE — 85004 AUTOMATED DIFF WBC COUNT: CPT | Performed by: FAMILY MEDICINE

## 2023-01-01 PROCEDURE — 70498 CT ANGIOGRAPHY NECK: CPT

## 2023-01-01 PROCEDURE — 85730 THROMBOPLASTIN TIME PARTIAL: CPT | Performed by: FAMILY MEDICINE

## 2023-01-01 PROCEDURE — 96376 TX/PRO/DX INJ SAME DRUG ADON: CPT | Performed by: FAMILY MEDICINE

## 2023-01-01 PROCEDURE — 80156 ASSAY CARBAMAZEPINE TOTAL: CPT | Mod: ZL | Performed by: STUDENT IN AN ORGANIZED HEALTH CARE EDUCATION/TRAINING PROGRAM

## 2023-01-01 PROCEDURE — 99285 EMERGENCY DEPT VISIT HI MDM: CPT | Performed by: EMERGENCY MEDICINE

## 2023-01-01 PROCEDURE — 85730 THROMBOPLASTIN TIME PARTIAL: CPT | Performed by: EMERGENCY MEDICINE

## 2023-01-01 PROCEDURE — 80053 COMPREHEN METABOLIC PANEL: CPT | Performed by: EMERGENCY MEDICINE

## 2023-01-01 PROCEDURE — 99207 PR ELECTROCARDIOGRAM, POST PROCEDURE - NO CHARGE: CPT | Performed by: INTERNAL MEDICINE

## 2023-01-01 PROCEDURE — G0463 HOSPITAL OUTPT CLINIC VISIT: HCPCS

## 2023-01-01 PROCEDURE — 99285 EMERGENCY DEPT VISIT HI MDM: CPT | Mod: 25 | Performed by: FAMILY MEDICINE

## 2023-01-01 PROCEDURE — 258N000003 HC RX IP 258 OP 636: Performed by: FAMILY MEDICINE

## 2023-01-01 PROCEDURE — 83930 ASSAY OF BLOOD OSMOLALITY: CPT | Mod: ZL | Performed by: STUDENT IN AN ORGANIZED HEALTH CARE EDUCATION/TRAINING PROGRAM

## 2023-01-01 PROCEDURE — 99213 OFFICE O/P EST LOW 20 MIN: CPT | Performed by: ORTHOPAEDIC SURGERY

## 2023-01-01 PROCEDURE — 99291 CRITICAL CARE FIRST HOUR: CPT | Performed by: INTERNAL MEDICINE

## 2023-01-01 PROCEDURE — 99214 OFFICE O/P EST MOD 30 MIN: CPT | Performed by: STUDENT IN AN ORGANIZED HEALTH CARE EDUCATION/TRAINING PROGRAM

## 2023-01-01 PROCEDURE — 90471 IMMUNIZATION ADMIN: CPT

## 2023-01-01 PROCEDURE — G0463 HOSPITAL OUTPT CLINIC VISIT: HCPCS | Mod: 25

## 2023-01-01 PROCEDURE — 36415 COLL VENOUS BLD VENIPUNCTURE: CPT | Performed by: EMERGENCY MEDICINE

## 2023-01-01 PROCEDURE — 80048 BASIC METABOLIC PNL TOTAL CA: CPT | Performed by: INTERNAL MEDICINE

## 2023-01-01 PROCEDURE — 250N000011 HC RX IP 250 OP 636: Performed by: INTERNAL MEDICINE

## 2023-01-01 PROCEDURE — 93005 ELECTROCARDIOGRAM TRACING: CPT | Performed by: INTERNAL MEDICINE

## 2023-01-01 PROCEDURE — 82077 ASSAY SPEC XCP UR&BREATH IA: CPT | Performed by: FAMILY MEDICINE

## 2023-01-01 PROCEDURE — G0463 HOSPITAL OUTPT CLINIC VISIT: HCPCS | Mod: 25,27

## 2023-01-01 PROCEDURE — 85610 PROTHROMBIN TIME: CPT | Performed by: EMERGENCY MEDICINE

## 2023-01-01 PROCEDURE — 70450 CT HEAD/BRAIN W/O DYE: CPT

## 2023-01-01 PROCEDURE — 85610 PROTHROMBIN TIME: CPT | Performed by: FAMILY MEDICINE

## 2023-01-01 PROCEDURE — 85025 COMPLETE CBC W/AUTO DIFF WBC: CPT | Performed by: EMERGENCY MEDICINE

## 2023-01-01 PROCEDURE — 71250 CT THORAX DX C-: CPT

## 2023-01-01 PROCEDURE — 99285 EMERGENCY DEPT VISIT HI MDM: CPT | Mod: 25 | Performed by: EMERGENCY MEDICINE

## 2023-01-01 PROCEDURE — 250N000013 HC RX MED GY IP 250 OP 250 PS 637: Performed by: EMERGENCY MEDICINE

## 2023-01-01 PROCEDURE — 36415 COLL VENOUS BLD VENIPUNCTURE: CPT | Performed by: FAMILY MEDICINE

## 2023-01-01 PROCEDURE — 99495 TRANSJ CARE MGMT MOD F2F 14D: CPT

## 2023-01-01 PROCEDURE — 93010 ELECTROCARDIOGRAM REPORT: CPT | Performed by: STUDENT IN AN ORGANIZED HEALTH CARE EDUCATION/TRAINING PROGRAM

## 2023-01-01 PROCEDURE — 80053 COMPREHEN METABOLIC PANEL: CPT | Mod: ZL | Performed by: STUDENT IN AN ORGANIZED HEALTH CARE EDUCATION/TRAINING PROGRAM

## 2023-01-01 PROCEDURE — 96374 THER/PROPH/DIAG INJ IV PUSH: CPT | Performed by: FAMILY MEDICINE

## 2023-01-01 PROCEDURE — 84484 ASSAY OF TROPONIN QUANT: CPT | Performed by: EMERGENCY MEDICINE

## 2023-01-01 PROCEDURE — 80053 COMPREHEN METABOLIC PANEL: CPT | Performed by: FAMILY MEDICINE

## 2023-01-01 PROCEDURE — 73030 X-RAY EXAM OF SHOULDER: CPT | Mod: LT

## 2023-01-01 PROCEDURE — 99284 EMERGENCY DEPT VISIT MOD MDM: CPT | Performed by: FAMILY MEDICINE

## 2023-01-01 PROCEDURE — 84484 ASSAY OF TROPONIN QUANT: CPT | Performed by: INTERNAL MEDICINE

## 2023-01-01 PROCEDURE — 85610 PROTHROMBIN TIME: CPT | Performed by: INTERNAL MEDICINE

## 2023-01-01 PROCEDURE — 99291 CRITICAL CARE FIRST HOUR: CPT | Mod: 25 | Performed by: INTERNAL MEDICINE

## 2023-01-01 PROCEDURE — 36415 COLL VENOUS BLD VENIPUNCTURE: CPT | Mod: ZL | Performed by: STUDENT IN AN ORGANIZED HEALTH CARE EDUCATION/TRAINING PROGRAM

## 2023-01-01 PROCEDURE — 82962 GLUCOSE BLOOD TEST: CPT

## 2023-01-01 PROCEDURE — G0508 CRIT CARE TELEHEA CONSULT 60: HCPCS | Mod: G0 | Performed by: PSYCHIATRY & NEUROLOGY

## 2023-01-01 PROCEDURE — 70496 CT ANGIOGRAPHY HEAD: CPT

## 2023-01-01 PROCEDURE — 99203 OFFICE O/P NEW LOW 30 MIN: CPT | Performed by: ORTHOPAEDIC SURGERY

## 2023-01-01 PROCEDURE — 73000 X-RAY EXAM OF COLLAR BONE: CPT | Mod: LT

## 2023-01-01 PROCEDURE — 96361 HYDRATE IV INFUSION ADD-ON: CPT | Performed by: FAMILY MEDICINE

## 2023-01-01 PROCEDURE — 250N000013 HC RX MED GY IP 250 OP 250 PS 637: Performed by: INTERNAL MEDICINE

## 2023-01-01 PROCEDURE — 80186 ASSAY OF PHENYTOIN FREE: CPT | Mod: ZL | Performed by: STUDENT IN AN ORGANIZED HEALTH CARE EDUCATION/TRAINING PROGRAM

## 2023-01-01 PROCEDURE — 36415 COLL VENOUS BLD VENIPUNCTURE: CPT | Performed by: INTERNAL MEDICINE

## 2023-01-01 PROCEDURE — 250N000011 HC RX IP 250 OP 636: Performed by: FAMILY MEDICINE

## 2023-01-01 RX ORDER — HYDROCODONE BITARTRATE AND ACETAMINOPHEN 5; 325 MG/1; MG/1
1 TABLET ORAL EVERY 6 HOURS PRN
Qty: 6 TABLET | Refills: 0 | Status: SHIPPED | OUTPATIENT
Start: 2023-01-01

## 2023-01-01 RX ORDER — IOPAMIDOL 755 MG/ML
75 INJECTION, SOLUTION INTRAVASCULAR ONCE
Status: COMPLETED | OUTPATIENT
Start: 2023-01-01 | End: 2023-01-01

## 2023-01-01 RX ORDER — SODIUM CHLORIDE 9 MG/ML
INJECTION, SOLUTION INTRAVENOUS CONTINUOUS PRN
Status: DISCONTINUED | OUTPATIENT
Start: 2023-01-01 | End: 2023-01-01 | Stop reason: HOSPADM

## 2023-01-01 RX ORDER — ONDANSETRON 2 MG/ML
4 INJECTION INTRAMUSCULAR; INTRAVENOUS
Status: DISCONTINUED | OUTPATIENT
Start: 2023-01-01 | End: 2023-01-01 | Stop reason: HOSPADM

## 2023-01-01 RX ORDER — CARBAMAZEPINE 200 MG/1
TABLET, EXTENDED RELEASE ORAL
Qty: 180 TABLET | Refills: 0 | OUTPATIENT
Start: 2023-01-01

## 2023-01-01 RX ORDER — LIDOCAINE 40 MG/G
CREAM TOPICAL
Status: DISCONTINUED | OUTPATIENT
Start: 2023-01-01 | End: 2023-01-01 | Stop reason: HOSPADM

## 2023-01-01 RX ORDER — CLOPIDOGREL BISULFATE 75 MG/1
300 TABLET ORAL ONCE
Status: COMPLETED | OUTPATIENT
Start: 2023-01-01 | End: 2023-01-01

## 2023-01-01 RX ORDER — CLOPIDOGREL BISULFATE 75 MG/1
75 TABLET ORAL DAILY
Qty: 90 TABLET | Refills: 1 | Status: SHIPPED | OUTPATIENT
Start: 2023-01-01

## 2023-01-01 RX ORDER — ATORVASTATIN CALCIUM 40 MG/1
80 TABLET, FILM COATED ORAL EVERY EVENING
Status: DISCONTINUED | OUTPATIENT
Start: 2023-01-01 | End: 2023-01-01 | Stop reason: HOSPADM

## 2023-01-01 RX ORDER — LEVETIRACETAM 1000 MG/1
1000 TABLET ORAL 2 TIMES DAILY
Qty: 180 TABLET | Refills: 3 | Status: SHIPPED | OUTPATIENT
Start: 2023-01-01 | End: 2023-01-01

## 2023-01-01 RX ORDER — LEVETIRACETAM 750 MG/1
1000 TABLET ORAL 2 TIMES DAILY
Qty: 180 TABLET | Refills: 3 | COMMUNITY
Start: 2023-01-01 | End: 2023-01-01

## 2023-01-01 RX ORDER — ATORVASTATIN CALCIUM 80 MG/1
TABLET, FILM COATED ORAL
COMMUNITY
Start: 2023-01-01 | End: 2023-01-01

## 2023-01-01 RX ORDER — SODIUM CHLORIDE 9 MG/ML
INJECTION, SOLUTION INTRAVENOUS CONTINUOUS
Status: DISCONTINUED | OUTPATIENT
Start: 2023-01-01 | End: 2023-01-01 | Stop reason: HOSPADM

## 2023-01-01 RX ORDER — CARBAMAZEPINE 200 MG/1
400 TABLET, EXTENDED RELEASE ORAL 2 TIMES DAILY
Qty: 120 TABLET | Refills: 3 | COMMUNITY
Start: 2023-01-01 | End: 2023-01-01

## 2023-01-01 RX ORDER — LEVETIRACETAM 750 MG/1
1000 TABLET ORAL 2 TIMES DAILY
Qty: 180 TABLET | Refills: 3 | Status: CANCELLED | OUTPATIENT
Start: 2023-01-01

## 2023-01-01 RX ORDER — LEVETIRACETAM 500 MG/1
TABLET ORAL
COMMUNITY

## 2023-01-01 RX ORDER — LABETALOL HYDROCHLORIDE 5 MG/ML
10 INJECTION, SOLUTION INTRAVENOUS EVERY 10 MIN PRN
Status: DISCONTINUED | OUTPATIENT
Start: 2023-01-01 | End: 2023-01-01 | Stop reason: HOSPADM

## 2023-01-01 RX ORDER — ATORVASTATIN CALCIUM 80 MG/1
80 TABLET, FILM COATED ORAL DAILY
Qty: 90 TABLET | Refills: 1 | Status: SHIPPED | OUTPATIENT
Start: 2023-01-01

## 2023-01-01 RX ORDER — ASPIRIN 81 MG/1
324 TABLET, CHEWABLE ORAL ONCE
Status: COMPLETED | OUTPATIENT
Start: 2023-01-01 | End: 2023-01-01

## 2023-01-01 RX ORDER — ASPIRIN 325 MG
325 TABLET ORAL ONCE
Status: COMPLETED | OUTPATIENT
Start: 2023-01-01 | End: 2023-01-01

## 2023-01-01 RX ORDER — CARBAMAZEPINE 200 MG/1
400 TABLET ORAL 2 TIMES DAILY
COMMUNITY

## 2023-01-01 RX ORDER — HYDRALAZINE HYDROCHLORIDE 20 MG/ML
10 INJECTION INTRAMUSCULAR; INTRAVENOUS EVERY 10 MIN PRN
Status: DISCONTINUED | OUTPATIENT
Start: 2023-01-01 | End: 2023-01-01 | Stop reason: HOSPADM

## 2023-01-01 RX ORDER — PHENYTOIN SODIUM 100 MG/1
200 CAPSULE, EXTENDED RELEASE ORAL 2 TIMES DAILY
Qty: 180 CAPSULE | Refills: 4 | Status: SHIPPED | OUTPATIENT
Start: 2023-01-01 | End: 2023-01-01

## 2023-01-01 RX ADMIN — SODIUM CHLORIDE: 9 INJECTION, SOLUTION INTRAVENOUS at 10:49

## 2023-01-01 RX ADMIN — SODIUM CHLORIDE 1000 ML: 9 INJECTION, SOLUTION INTRAVENOUS at 09:53

## 2023-01-01 RX ADMIN — HYDROMORPHONE HYDROCHLORIDE 1 MG: 1 INJECTION, SOLUTION INTRAMUSCULAR; INTRAVENOUS; SUBCUTANEOUS at 10:45

## 2023-01-01 RX ADMIN — HYDROMORPHONE HYDROCHLORIDE 1 MG: 1 INJECTION, SOLUTION INTRAMUSCULAR; INTRAVENOUS; SUBCUTANEOUS at 09:51

## 2023-01-01 RX ADMIN — IOPAMIDOL 75 ML: 755 INJECTION, SOLUTION INTRAVENOUS at 16:32

## 2023-01-01 RX ADMIN — IOPAMIDOL 75 ML: 755 INJECTION, SOLUTION INTRAVENOUS at 15:35

## 2023-01-01 RX ADMIN — CLOPIDOGREL BISULFATE 300 MG: 75 TABLET, FILM COATED ORAL at 17:57

## 2023-01-01 RX ADMIN — ASPIRIN 325 MG ORAL TABLET 325 MG: 325 PILL ORAL at 17:57

## 2023-01-01 RX ADMIN — ASPIRIN 81 MG CHEWABLE TABLET 324 MG: 81 TABLET CHEWABLE at 17:29

## 2023-01-01 RX ADMIN — CLOPIDOGREL BISULFATE 300 MG: 75 TABLET, FILM COATED ORAL at 17:30

## 2023-01-01 RX ADMIN — ATORVASTATIN CALCIUM 80 MG: 40 TABLET, FILM COATED ORAL at 21:46

## 2023-01-01 ASSESSMENT — ENCOUNTER SYMPTOMS
CONFUSION: 0
CHEST TIGHTNESS: 0
SEIZURES: 0
VOMITING: 0
WHEEZING: 0
SEIZURES: 0
SPEECH DIFFICULTY: 1
PALPITATIONS: 0
CHILLS: 0
DYSURIA: 0
WEAKNESS: 1
TREMORS: 0
LIGHT-HEADEDNESS: 1
FEVER: 0
NAUSEA: 0
FEVER: 0
FACIAL ASYMMETRY: 1
SHORTNESS OF BREATH: 0
NUMBNESS: 0
SHORTNESS OF BREATH: 0
FATIGUE: 1
WEAKNESS: 0
HEADACHES: 0
SLEEP DISTURBANCE: 0
LIGHT-HEADEDNESS: 0
ARTHRALGIAS: 0
AGITATION: 0
HALLUCINATIONS: 0
SPEECH DIFFICULTY: 0
DIZZINESS: 0

## 2023-01-01 ASSESSMENT — PAIN SCALES - GENERAL
PAINLEVEL: MILD PAIN (3)
PAINLEVEL: NO PAIN (0)
PAINLEVEL: NO PAIN (0)

## 2023-01-01 ASSESSMENT — ACTIVITIES OF DAILY LIVING (ADL)
ADLS_ACUITY_SCORE: 35

## 2023-01-04 NOTE — LETTER
January 5, 2023      Raúl Adams  [address]   [facility] [address]        Dear ,    We are writing to inform you of your test results.    Your labs are as below. Your low sodium has improved from previous. Your carbamazepine level is normal. Please follow up with neurology as scheduled and I would like him to start prescribing your seizure medications.     Resulted Orders   Carbamazepine   Result Value Ref Range    Carbamazepine 7.0 4.0 - 12.0 ug/mL   Comprehensive Metabolic Panel   Result Value Ref Range    Sodium 132 (L) 136 - 145 mmol/L    Potassium 4.1 3.4 - 5.3 mmol/L    Chloride 98 98 - 107 mmol/L    Carbon Dioxide (CO2) 23 22 - 29 mmol/L    Anion Gap 11 7 - 15 mmol/L    Urea Nitrogen 9.9 6.0 - 20.0 mg/dL    Creatinine 0.68 0.67 - 1.17 mg/dL    Calcium 9.8 8.6 - 10.0 mg/dL    Glucose 102 (H) 70 - 99 mg/dL    Alkaline Phosphatase 196 (H) 40 - 129 U/L    AST 34 10 - 50 U/L    ALT 24 10 - 50 U/L    Protein Total 8.4 (H) 6.4 - 8.3 g/dL    Albumin 4.5 3.5 - 5.2 g/dL    Bilirubin Total 0.5 <=1.2 mg/dL    GFR Estimate >90 >60 mL/min/1.73m2      Comment:      Effective December 21, 2021 eGFRcr in adults is calculated using the 2021 CKD-EPI creatinine equation which includes age and gender (Maria R et al., NEJM, DOI: 10.1056/OXQQav9315166)       If you have any questions or concerns, please call the clinic at the number listed above.       Sincerely,      Yang Mackey MD

## 2023-01-04 NOTE — NURSING NOTE
"Chief Complaint   Patient presents with     Recheck Medication       Medication reconciliation completed.    FOOD SECURITY SCREENING QUESTIONS:    The next two questions are to help us understand your food security.  If you are feeling you need any assistance in this area, we have resources available to support you today.    Hunger Vital Signs:  Within the past 12 months we worried whether our food would run out before we got money to buy more. Never  Within the past 12 months the food we bought just didn't last and we didn't have money to get more. Never    Initial /72 (BP Location: Right arm, Patient Position: Sitting, Cuff Size: Adult Regular)   Pulse 91   Temp 97.9  F (36.6  C) (Temporal)   Resp 16   Wt 72.2 kg (159 lb 3.2 oz)   SpO2 100%   BMI 20.17 kg/m   Estimated body mass index is 20.17 kg/m  as calculated from the following:    Height as of 9/13/22: 1.892 m (6' 2.5\").    Weight as of this encounter: 72.2 kg (159 lb 3.2 oz).       Marie Melendez LPN .......  1/4/2023  1:55 PM  "

## 2023-01-04 NOTE — PROGRESS NOTES
Assessment & Plan         ICD-10-CM    1. History of seizure  Z87.898 carBAMazepine (TEGRETOL XR) 200 MG 12 hr tablet     phenytoin (DILANTIN) 100 MG ER capsule     Comprehensive Metabolic Panel     Osmolality     Phenytoin free     Carbamazepine     Osmolality urine     Carbamazepine     Phenytoin free     Osmolality     Comprehensive Metabolic Panel     CANCELED: Osmolality urine      2. Hyponatremia  E87.1 Osmolality     Osmolality urine     Osmolality     CANCELED: Osmolality urine        History of seizure: Updated his antiepileptic medications to match what neurology would like him on.  He was given refills of this and he has follow-up with neurology in 1 to 2 months.  I would like neurology to assume refills of this as I am not sure what all needs to be followed as far as his antiepileptic medications and labs.  He does have some incidental hyponatremia which may be due to his antiepileptic medications.  Sodium has improved from last check but is still slightly low at 132.  Await urine and serum osm.       Follow-up with me in 3 to 6 months for annual physical exam.    Tetanus shot given today.      No follow-ups on file.    Yang Mackey MD  [facility]      Sherrie Olsen is a 57 year old, presenting for the following health issues:  Recheck Medication      History of Present Illness       Reason for visit:  Follow up    He eats 0-1 servings of fruits and vegetables daily.He consumes 1 sweetened beverage(s) daily.He exercises with enough effort to increase his heart rate 9 or less minutes per day.  He exercises with enough effort to increase his heart rate 3 or less days per week.   He is taking medications regularly.       Medication follow up    Here for follow-up of his seizure medications neurology has recently started him on Keppra 750 mg and continuing his carbamazepine 400 mg twice daily, they would like him to wean off of his phenytoin.  I discussed that I would like him  to follow-up with neurology for management of his antiepileptic medications as I am not a neurologist and do not know exactly what to follow as far as levels, therapeutic dosing etc.  He appreciates this.  He also has not reviewed his MRI results which were reviewed with him today.  Essentially normal.    He has no other complaints today.  Blood pressures under good control he is still having occasional staring episodes and falls.        Review of Systems         Objective    /72 (BP Location: Right arm, Patient Position: Sitting, Cuff Size: Adult Regular)   Pulse 91   Temp 97.9  F (36.6  C) (Temporal)   Resp 16   Wt 72.2 kg (159 lb 3.2 oz)   SpO2 100%   BMI 20.17 kg/m    Body mass index is 20.17 kg/m .  Physical Exam   General: Pleasant 57-year-old man sitting clinic no acute distress  CV: Regular rate and rhythm no peripheral edema appreciated  Pulmonary: Clear to auscultation    Reviewed brain MRI results with patient today.

## 2023-03-09 NOTE — TELEPHONE ENCOUNTER
[id number] of [place] sent Rx request for the following:    In clinical absence of patient's primary, Yang Mackey, patient is requesting that this message be sent to the covering provider for consideration please.     Requested Prescriptions   Pending Prescriptions Disp Refills     carBAMazepine (TEGRETOL XR) 200 MG 12 hr tablet [Pharmacy Med Name: CARBAMAZEPINE 200MG ER TABLET] 120 tablet 3     Sig: TAKE 1 TABLET (200 MG) BY MOUTH 2 TIMES DAILY       Anti-Seizure Meds Protocol  Failed - 3/9/2023 11:46 AM        Failed - Review Authorizing provider's last note.      Refer to last progress notes: confirm request is for original authorizing provider (cannot be through other providers)        Failed - Normal CBC on file in past 26 months     Recent Labs   Lab Test 09/19/22  1418   WBC 5.2   RBC 3.70*   HGB 12.8*   HCT 34.9*           Historically reported    Last Office Visit:              1/4/23   Future Office visit:           None    Per LOV note:  Follow-up with me in 3 to 6 months for annual physical exam.    Maci Pyle RN .............. 3/9/2023  11:47 AM

## 2023-04-08 NOTE — ED PROVIDER NOTES
History     Chief Complaint   Patient presents with     Fall     Shoulder Pain     The history is provided by the patient, a friend and medical records.     Raúl Adams is a 57 year old male here after he fell down a steep, full flight of stairs to his basement. He missed the first step and fell all the way down to the concrete floor. He has left shoulder and left sided chest wall pain. This occurred while he was home alone so he crawled back upstairs to his phone and called his friend. His friend went over and Raúl wanted to eat some breakfast so that he could take his anti-seizure meds before coming to the ED. No LOC and no seizure. He did have a fall last week and injured his left chest wall which is worse now after the fall.     He has epilepsy (on Tegretol XR, Keppra, Dilantin).     Allergies:  No Known Allergies    Problem List:    Patient Active Problem List    Diagnosis Date Noted     History of seizure 07/15/2022     Priority: Medium        Past Medical History:    Past Medical History:   Diagnosis Date     Seizures (H)        Past Surgical History:    Past Surgical History:   Procedure Laterality Date     COLONOSCOPY N/A 09/13/2022    2 sessile serrated and 1 hyperplastic, follow up 5 years, 9/13/27       Family History:    Family History   Problem Relation Age of Onset     Coronary Artery Disease Father        Social History:  Marital Status:  Single [1]  Social History     Tobacco Use     Smoking status: Every Day     Packs/day: 1.00     Types: Cigarettes     Smokeless tobacco: Never   Vaping Use     Vaping status: Never Used   Substance Use Topics     Alcohol use: Yes     Drug use: Not Currently        Medications:    carBAMazepine (TEGRETOL XR) 200 MG 12 hr tablet  HYDROcodone-acetaminophen (NORCO) 5-325 MG tablet  levETIRAcetam (KEPPRA) 750 MG tablet  phenytoin (DILANTIN) 100 MG ER capsule          Review of Systems   Musculoskeletal:        Left shoulder pain, left sided chest wall pain    All other systems reviewed and are negative.      Physical Exam   BP: 137/84  Pulse: 96  Temp: 96.8  F (36  C)  Resp: 20  Weight: 71.7 kg (158 lb)  SpO2: 99 %      Physical Exam  Vitals and nursing note reviewed.   Constitutional:       General: He is not in acute distress.     Appearance: Normal appearance. He is not ill-appearing, toxic-appearing or diaphoretic.   HENT:      Head: Normocephalic and atraumatic.      Nose: Nose normal.   Eyes:      Extraocular Movements: Extraocular movements intact.      Conjunctiva/sclera: Conjunctivae normal.   Cardiovascular:      Rate and Rhythm: Normal rate and regular rhythm.      Pulses: Normal pulses.      Heart sounds: Normal heart sounds.   Pulmonary:      Effort: Pulmonary effort is normal. No respiratory distress.      Breath sounds: Normal breath sounds.      Comments: He has left sided chest wall tenderness.  Chest:      Chest wall: Tenderness present.   Abdominal:      General: Abdomen is flat. Bowel sounds are normal.      Tenderness: There is no abdominal tenderness.   Musculoskeletal:         General: Tenderness present. No swelling.      Cervical back: Normal range of motion and neck supple. No tenderness.      Comments: He has left shoulder tenderness and left scapula tenderness. He has no tenderness or injury of bilateral LE, RUE or the rest of the LUE. No midline spine tenderness, no neck tenderness.    Skin:     General: Skin is warm and dry.      Findings: No bruising or erythema.   Neurological:      General: No focal deficit present.      Mental Status: He is alert and oriented to person, place, and time.   Psychiatric:         Mood and Affect: Mood normal.         Behavior: Behavior normal.         Results for orders placed or performed during the hospital encounter of 04/08/23 (from the past 24 hour(s))   CBC with platelets differential    Narrative    The following orders were created for panel order CBC with platelets differential.  Procedure                                Abnormality         Status                     ---------                               -----------         ------                     CBC with platelets and d...[[id number]]  Abnormal            Final result                 Please view results for these tests on the individual orders.   Comprehensive metabolic panel   Result Value Ref Range    Sodium 123 (L) 136 - 145 mmol/L    Potassium 4.2 3.4 - 5.3 mmol/L    Chloride 89 (L) 98 - 107 mmol/L    Carbon Dioxide (CO2) 24 22 - 29 mmol/L    Anion Gap 10 7 - 15 mmol/L    Urea Nitrogen 6.6 6.0 - 20.0 mg/dL    Creatinine 0.58 (L) 0.67 - 1.17 mg/dL    Calcium 8.9 8.6 - 10.0 mg/dL    Glucose 124 (H) 70 - 99 mg/dL    Alkaline Phosphatase 235 (H) 40 - 129 U/L    AST 29 10 - 50 U/L    ALT 22 10 - 50 U/L    Protein Total 7.7 6.4 - 8.3 g/dL    Albumin 3.9 3.5 - 5.2 g/dL    Bilirubin Total 0.5 <=1.2 mg/dL    GFR Estimate >90 >60 mL/min/1.73m2   INR   Result Value Ref Range    INR 1.05 0.85 - 1.15   Partial thromboplastin time   Result Value Ref Range    aPTT 29 22 - 38 Seconds   Alcohol ethyl   Result Value Ref Range    Alcohol ethyl <0.01 <=0.01 g/dL   CBC with platelets and differential   Result Value Ref Range    WBC Count 6.4 4.0 - 11.0 10e3/uL    RBC Count 3.80 (L) 4.40 - 5.90 10e6/uL    Hemoglobin 13.0 (L) 13.3 - 17.7 g/dL    Hematocrit 35.7 (L) 40.0 - 53.0 %    MCV 94 78 - 100 fL    MCH 34.2 (H) 26.5 - 33.0 pg    MCHC 36.4 31.5 - 36.5 g/dL    RDW 12.0 10.0 - 15.0 %    Platelet Count 293 150 - 450 10e3/uL    % Neutrophils 74 %    % Lymphocytes 14 %    % Monocytes 10 %    % Eosinophils 1 %    % Basophils 1 %    % Immature Granulocytes 0 %    NRBCs per 100 WBC 0 <1 /100    Absolute Neutrophils 4.7 1.6 - 8.3 10e3/uL    Absolute Lymphocytes 0.9 0.8 - 5.3 10e3/uL    Absolute Monocytes 0.6 0.0 - 1.3 10e3/uL    Absolute Eosinophils 0.1 0.0 - 0.7 10e3/uL    Absolute Basophils 0.0 0.0 - 0.2 10e3/uL    Absolute Immature Granulocytes 0.0 <=0.4 10e3/uL    Absolute  NRBCs 0.0 10e3/uL   Extra Tube    Narrative    The following orders were created for panel order Extra Tube.  Procedure                               Abnormality         Status                     ---------                               -----------         ------                     Extra Blue Top Tube[[id number]]                                                         Extra Green Top (Lithium...[[id number]]                      Final result                 Please view results for these tests on the individual orders.   Extra Green Top (Lithium Heparin) ON ICE   Result Value Ref Range    Hold Specimen JIC    Extra Tube    Narrative    The following orders were created for panel order Extra Tube.  Procedure                               Abnormality         Status                     ---------                               -----------         ------                     Extra Red Top Tube[[id number]]                               Final result                 Please view results for these tests on the individual orders.   Extra Red Top Tube   Result Value Ref Range    Hold Specimen JIC    CT chest without contrast    Narrative    PROCEDURE: CT CHEST W/O CONTRAST 4/8/2023 10:33 AM    HISTORY: fell down stairs to his basement    COMPARISONS: None.    Meds/Dose Given:    TECHNIQUE: CT scan of the chest without IV contrast sagittal coronal  reconstructions    FINDINGS: There are compression fractures of the T4, T8,T9 and T11  vertebra of uncertain age. No acute rib fractures are seen.    There is extensive left-sided pleural calcifications. There is  significant volume loss in the left lower lobe with some confluent  opacities posteriorly which may be acute or chronic.    Extensive right upper lobe opacity with bronchiectasis. These are most  likely chronic abnormalities. However an acute infiltrate cannot be  excluded.    Moderate emphysematous changes are noted in the lungs.    The hilar and mediastinal lymph nodes are  normal in caliber. Axillary  and supraclavicular lymph nodes appear normal. The upper portion of  the liver spleen and pancreas appear normal. The adrenal glands are  normal.         Impression    IMPRESSION: Multiple thoracic compression fractures of uncertain age.    Confluent opacities in the right upper lobe with bronchiectasis. These  opacities are most likely on an inflammatory basis.    Left lower lobe volume loss and a rounded confluent opacity  posteriorly in the left lower lobe. This may be on inflammatory basis  however malignancy cannot be excluded.    MORGAN MARTINEZ MD         SYSTEM ID:  [id number]   XR Shoulder Left 2 Views    Narrative    PROCEDURE:  XR SHOULDER LEFT 2 VIEWS    HISTORY: left shoulder pain after a fall    COMPARISON:  None.    TECHNIQUE:  No views of the shoulder were obtained.    FINDINGS:  There is a fracture of the distal clavicle. The fracture  appears distal to the coracoclavicular ligament. The major distal  fracture fragment is displaced caudally by 12 mm. The  acromioclavicular joint is normally aligned. The  scapula and proximal  humerus are intact.       Impression    IMPRESSION: Distal clavicular fracture      MORGAN MARTINEZ MD         SYSTEM ID:  [id number]       Medications   lidocaine 1 % 0.1-1 mL (has no administration in time range)   lidocaine (LMX4) cream (has no administration in time range)   sodium chloride (PF) 0.9% PF flush 3 mL (3 mLs Intracatheter $Given 4/8/23 0953)   sodium chloride (PF) 0.9% PF flush 3 mL (has no administration in time range)   HYDROmorphone (DILAUDID) injection 1 mg (1 mg Intravenous $Given 4/8/23 1045)   ondansetron (ZOFRAN) injection 4 mg (has no administration in time range)   0.9% sodium chloride BOLUS (0 mLs Intravenous Stopped 4/8/23 1047)     Followed by   sodium chloride 0.9% infusion ( Intravenous $New Bag 4/8/23 1049)       Assessments & Plan (with Medical Decision Making)  Raúl Adams is a 57 year old male here after he  fell down a steep, full flight of stairs to his basement. He missed the first step and fell all the way down to the concrete floor. He has left shoulder and left sided chest wall pain. This occurred while he was home alone so he crawled back upstairs to his phone and called his friend. His friend went over and Raúl wanted to eat some breakfast so that he could take his anti-seizure meds before coming to the ED. No LOC and no seizure. He did have a fall last week and injured his left chest wall which is worse now after the fall.  He has epilepsy (on Tegretol XR, Keppra, Dilantin). VS in the ED on room air /82   Pulse 85   Temp 96.8  F (36  C) (Tympanic)   Resp 20   Wt 71.7 kg (158 lb)   SpO2 95%   BMI 20.01 kg/m   Exam in the ED shows left shoulder pain, left scapular pain, some left clavicle pain and minimal left sided chest wall pain. Remainder of exam normal.  We started an IV and gave Dilaudid and Zofran and IVF.  Labs show CBC with hgb 13, CMP with Na 123, Cl 89, alk phos 235 (chronically elevated), INR normal, PTT normal, ethanol zero.  CT chest shows no acute findings (patient has no midline spinal tenderness so I think the compression fractures are not acute).    12:05 PM  Left shoulder xray shows a fracture of the distal clavicle. Repeat exam of the spine shows no tenderness. I do think the compression fractures are chronic. We will get him home with a sling, Vicodin for bedtime and recommend a follow up in clinic in 1-2 weeks.      I have reviewed the nursing notes.    I have reviewed the findings, diagnosis, plan and need for follow up with the patient.    Medical Decision Making  The patient's presentation was of low complexity (an acute and uncomplicated illness or injury).    The patient's evaluation involved:  an assessment requiring an independent historian (see separate area of note for details)  ordering and/or review of 2 test(s) in this encounter (see separate area of note for  details)    The patient's management necessitated moderate risk (prescription drug management including medications given in the ED).        New Prescriptions    HYDROCODONE-ACETAMINOPHEN (NORCO) 5-325 MG TABLET    Take 1 tablet by mouth every 6 hours as needed for severe pain       Final diagnoses:   Closed displaced fracture of acromial end of left clavicle, initial encounter   Fall down stairs, initial encounter       4/8/2023   [facility], Yo Thao MD  04/08/23 1212

## 2023-04-08 NOTE — DISCHARGE INSTRUCTIONS
Raúl    You have a left clavicle fracture.  I recommend the following:    Vicodin for pain. Take this at night. This will increase your risk of falling so be careful.   Sling for your left arm. This will help the bone heal by keeping it from moving a lot.   Follow up in clinic in 1-2 weeks.     Thank you for choosing our Emergency Department for your care.     You may receive a phone call or letter for a survey about your care in our ED.  Please complete this as this is how we improve care for our patients.     If you have any questions after leaving the ED you can call or text me on my cell phone at [phone] and I will get back to you at some point. This does not mean that I am on call and if you are not doing well please return to the ED.     Sincerely,    Dr Godfrey Moore M.D.

## 2023-04-24 NOTE — PROGRESS NOTES
Visit Date: 2023    CHIEF COMPLAINT:  A 57-year-old gentleman with a left clavicle fracture.    HISTORY OF PRESENT ILLNESS:  Nidhi Buckner is a 57-year-old gentleman who fell down the stairs after he missed quite steep stairs at his house.  Date of injury was 2023.  He ended up hitting his left shoulder and had immediate pain at the distal clavicle.    PHYSICAL EXAMINATION:  His shoulder still shows a little bit of bruising but that is resolving.  He is tender to palpation over the top of the shoulder.  It was not put through range of motion today given the proximity to the injury.  Otherwise, neuro and vascularly intact.    IMAGING:  X-ray examination shows minimal elevation of the clavicle and this is a distal clavicle excision.  I suspect that some of the coracoclavicular ligaments are still attached to the medial fragment and there is some mild shortening but this is quite mild.    IMPRESSION AND PLAN:  A 57-year-old gentleman with a distal clavicle fracture.  This is nonoperative in management and will remain so.  We will see him back in approximately 3 weeks with a repeat x-ray of the shoulder.    Uri Duncan MD        D: 2023   T: 2023   MT: FRANCISCO    Name:     NIDHI BUCKNER  MRN:      [id number]        Account:    [id number]   :      1965           Visit Date: 2023     Document: [id number]

## 2023-04-24 NOTE — PROGRESS NOTES
Patient is here for consult on his left clavicle fracture.   Larisa Galdamez LPN .....................4/24/2023 2:28 PM

## 2023-05-15 PROBLEM — E87.1 HYPONATREMIA: Status: ACTIVE | Noted: 2018-09-26

## 2023-05-15 NOTE — ED PROVIDER NOTES
History     Chief Complaint   Patient presents with     Extremity Weakness     One-sided Weakness     Stroke Symptoms     HPI  Raúl Adams is a 57 year old male who presented today to orthopedics clinic for follow-up of his left clavicle issue.  While there they noticed that he appeared to have a little bit of a left facial droop and his left arm was weak.  He and his friend state that yesterday he woke up and really could not use his left arm very much.  Also noticed his left leg was weak.  Seems a little bit better today but still definitely weaker than normal.  His speech is also little bit off for the last couple of days.  Denies headache.  Has not been ill in any other way recently.  No chest pain.    Allergies:  No Known Allergies    Problem List:    Patient Active Problem List    Diagnosis Date Noted     History of seizure 07/15/2022     Priority: Medium        Past Medical History:    Past Medical History:   Diagnosis Date     Seizures (H)        Past Surgical History:    Past Surgical History:   Procedure Laterality Date     COLONOSCOPY N/A 09/13/2022    2 sessile serrated and 1 hyperplastic, follow up 5 years, 9/13/27       Family History:    Family History   Problem Relation Age of Onset     Coronary Artery Disease Father        Social History:  Marital Status:  Single [1]  Social History     Tobacco Use     Smoking status: Every Day     Packs/day: 1.00     Types: Cigarettes     Smokeless tobacco: Never   Vaping Use     Vaping status: Never Used   Substance Use Topics     Alcohol use: Yes     Drug use: Not Currently        Medications:    carBAMazepine (TEGRETOL XR) 200 MG 12 hr tablet  levETIRAcetam (KEPPRA) 750 MG tablet  phenytoin (DILANTIN) 100 MG ER capsule  HYDROcodone-acetaminophen (NORCO) 5-325 MG tablet          Review of Systems   Constitutional: Negative for chills and fever.   HENT: Negative for congestion.    Eyes: Negative for visual disturbance.   Respiratory: Negative for chest  "tightness and shortness of breath.    Cardiovascular: Negative for chest pain.   Gastrointestinal: Negative for nausea and vomiting.   Genitourinary: Negative for dysuria.   Musculoskeletal: Negative for arthralgias.   Skin: Negative for rash.   Neurological: Positive for facial asymmetry, speech difficulty and weakness. Negative for tremors, seizures, syncope, light-headedness, numbness and headaches.   Psychiatric/Behavioral: Negative for agitation.       Physical Exam   BP: (!) 165/87  Pulse: 82  Resp: 18  Height: 190.5 cm (6' 3\")  Weight: 69.9 kg (154 lb)  SpO2: 100 %      Physical Exam  Vitals and nursing note reviewed.   Constitutional:       Appearance: Normal appearance.   HENT:      Head: Normocephalic and atraumatic.      Mouth/Throat:      Mouth: Mucous membranes are moist.   Eyes:      Conjunctiva/sclera: Conjunctivae normal.   Cardiovascular:      Rate and Rhythm: Normal rate and regular rhythm.      Heart sounds: Normal heart sounds.   Pulmonary:      Effort: Pulmonary effort is normal.      Breath sounds: Normal breath sounds.   Abdominal:      General: Abdomen is flat. Bowel sounds are normal.   Skin:     General: Skin is warm and dry.   Neurological:      Mental Status: He is alert and oriented to person, place, and time.      GCS: GCS eye subscore is 4. GCS verbal subscore is 5. GCS motor subscore is 6.      Cranial Nerves: Facial asymmetry present.      Coordination: Romberg sign negative. Finger-Nose-Finger Test normal.      Comments: Right-sided facial droop.  Otherwise cranial nerves II through XII intact.   Left arm is very slow to move and he has a hard time holding it out, however once he gets that up into position he does not have pronator drift.  It is also hard for him to get his hand up to grasp my fingers, however once he does grasp feels equal bilaterally.   Psychiatric:         Behavior: Behavior normal.         ED Course                 Procedures         EKG shows normal sinus " rhythm 81 bpm.  No acute ST segment or T wave changes.  No ectopy.       Results for orders placed or performed during the hospital encounter of 05/15/23 (from the past 24 hour(s))   Extra Tube ([place] Draw)    Narrative    The following orders were created for panel order Extra Tube ([place] Draw).  Procedure                               Abnormality         Status                     ---------                               -----------         ------                     Extra Blue Top Tube[[id number]]                              Final result               Extra Red Top Tube[[id number]]                               Final result               Extra Green Top (Lithium...[[id number]]                      Final result               Extra Purple Top Tube[[id number]]                            Final result               Extra Green Top (Lithium...[[id number]]                      Final result                 Please view results for these tests on the individual orders.   Extra Blue Top Tube   Result Value Ref Range    Hold Specimen x    Extra Red Top Tube   Result Value Ref Range    Hold Specimen x    Extra Green Top (Lithium Heparin) Tube   Result Value Ref Range    Hold Specimen x    Extra Purple Top Tube   Result Value Ref Range    Hold Specimen x    Extra Green Top (Lithium Heparin) ON ICE   Result Value Ref Range    Hold Specimen x    CBC with Platelets & Differential    Narrative    The following orders were created for panel order CBC with Platelets & Differential.  Procedure                               Abnormality         Status                     ---------                               -----------         ------                     CBC with platelets and d...[[id number]]  Abnormal            Final result                 Please view results for these tests on the individual orders.   INR   Result Value Ref Range    INR 1.03 0.85 - 1.15   Partial thromboplastin time   Result Value Ref Range    aPTT 26 22 - 38  Seconds   CBC with platelets and differential   Result Value Ref Range    WBC Count 4.7 4.0 - 11.0 10e3/uL    RBC Count 3.72 (L) 4.40 - 5.90 10e6/uL    Hemoglobin 12.7 (L) 13.3 - 17.7 g/dL    Hematocrit 34.9 (L) 40.0 - 53.0 %    MCV 94 78 - 100 fL    MCH 34.1 (H) 26.5 - 33.0 pg    MCHC 36.4 31.5 - 36.5 g/dL    RDW 13.1 10.0 - 15.0 %    Platelet Count 297 150 - 450 10e3/uL    % Neutrophils 64 %    % Lymphocytes 24 %    % Monocytes 11 %    % Eosinophils 1 %    % Basophils 0 %    % Immature Granulocytes 0 %    NRBCs per 100 WBC 0 <1 /100    Absolute Neutrophils 3.0 1.6 - 8.3 10e3/uL    Absolute Lymphocytes 1.1 0.8 - 5.3 10e3/uL    Absolute Monocytes 0.5 0.0 - 1.3 10e3/uL    Absolute Eosinophils 0.0 0.0 - 0.7 10e3/uL    Absolute Basophils 0.0 0.0 - 0.2 10e3/uL    Absolute Immature Granulocytes 0.0 <=0.4 10e3/uL    Absolute NRBCs 0.0 10e3/uL   Comprehensive metabolic panel   Result Value Ref Range    Sodium 126 (L) 136 - 145 mmol/L    Potassium 4.1 3.4 - 5.3 mmol/L    Chloride 91 (L) 98 - 107 mmol/L    Carbon Dioxide (CO2) 23 22 - 29 mmol/L    Anion Gap 12 7 - 15 mmol/L    Urea Nitrogen 7.5 6.0 - 20.0 mg/dL    Creatinine 0.57 (L) 0.67 - 1.17 mg/dL    Calcium 9.0 8.6 - 10.0 mg/dL    Glucose 97 70 - 99 mg/dL    Alkaline Phosphatase 238 (H) 40 - 129 U/L    AST 36 10 - 50 U/L    ALT 29 10 - 50 U/L    Protein Total 8.0 6.4 - 8.3 g/dL    Albumin 4.4 3.5 - 5.2 g/dL    Bilirubin Total 0.4 <=1.2 mg/dL    GFR Estimate >90 >60 mL/min/1.73m2   CT Head w/o Contrast    Narrative    PROCEDURE: CT HEAD W/O CONTRAST   5/15/2023 3:41 PM    HISTORY:Male, age,  57 years, , , Acute neuro deficit, stroke  suspected    COMPARISON: MRI brain 11/5/2022    TECHNIQUE: CT of the brain without contrast. Axial; sagittal and  coronal reconstructed images were reviewed.    FINDINGS: Ventricles and sulci demonstrate mild ex vacuo dilatation of  the right lateral ventricle. Gray and white matter demonstrate there  is a decreased density in the right  frontal and parietal region  suggesting developing encephalomalacia from old watershed infarcts in  the right MCA distribution.    There is no evidence of mass, mass effect or midline shift. No  evidence of acute hemorrhage. An air-fluid level seen within the right  maxillary sinus.    No acute fracture.       Impression    IMPRESSION:   No evidence of acute intracranial hemorrhage.     Loss of gray and white matter differentiation in the right frontal and  right parietal region suggesting sequelae of watershed infarcts in the  right MCA distribution. Cannot exclude acute versus subacute ischemia.  MRI would better characterize.    Right maxillary sinus air-fluid level.         This facility minimizes radiation dose by adjusting the mA and/or kV  according to each patient size.      This CT scan was performed using one or more the following dose  reduction techniques:    -Automated exposure control,  -Adjustment of the mA and/or kV according to patient's size, and/or,  -Use of iterative reconstruction technique.      JACQUELINE TAVERAS MD         SYSTEM ID:  [id number]   CTA Head Neck with Contrast    Narrative    PROCEDURE: CTA HEAD NECK W CONTRAST   5/15/2023 3:41 PM    HISTORY:Male, age,  57 years, , , Acute neuro deficit, stroke  suspected    COMPARISON: CT scan of the chest 4/8/2023    TECHNIQUE: CT angiogram was performed of the head and neck  before and  after the administration of intravenous contrast. Sagittal, coronal,  axial and 3-D reconstructed MIP  images were reviewed.    FINDINGS:   CTA neck: Visualized portions of the aortic arch are unremarkable.  Visualized portions of the innominate artery, left subclavian and left  common carotid artery are also unremarkable. The proximal aspects of  the right vertebral artery are occluded. The vessel is reconstituted  at the C3-4 level via collateralization. The distal aspects of the  right vertebral artery is hypoplastic. There is ectasia of the distal  left  vertebral artery. Dense heavily calcified plaque is seen within  the left and right carotid bifurcation with high-grade stenosis  involving the proximal portions of the right internal carotid artery  and moderate stenosis of the left internal carotid artery. The  external carotid arteries and branches are unremarkable.    CTA brain: Dolichoectasia of the left vertebral artery with dense  atherosclerotic calcification. Images suggests a 6.5 mm fusiform  aneurysm in the left vertebral artery. The vertebral basilar system is  patent. The posterior cerebral arteries and branches are also patent.  Petrous and cavernous portions of the left and right internal carotid  artery are patent. The intracerebral arteries and branches are  unremarkable. The middle cerebral arteries and branches are also  unremarkable.    Contrast-enhanced CT scan of the neck: Muscles of mastication and the  salivary glands are unremarkable. The oral and pharyngeal mucosa is  also unremarkable. An air-fluid level is seen within the right  maxillary sinus. There is no evidence of pathologic lymph node  enlargement. The larynx is not well seen however does appear to  demonstrate slight asymmetry with somewhat nodular enlargement of the  right vocal cord. Thyroid gland is unremarkable. Dense consolidation  again seen in the dependent portions of the right upper lobe. Mild to  moderate degenerative changes again seen within the cervical spine.    Contrast-enhanced CT scan of the brain: Right frontal and  parietal/occipital encephalomalacia suggesting sequelae of watershed  infarcts. There is no evidence of abnormal enhancement associated with  these lesions.        Impression    IMPRESSION:   High-grade stenosis involving the proximal right internal carotid  artery secondary to densely calcified atherosclerotic plaque.    6.5 mm fusiform aneurysm in the distal left vertebral artery without  evidence of acute complication.    Moderate stenosis involving  the proximal left internal carotid artery.    Complete occlusion of the proximal right vertebral artery with  reconstitution of the vessel at the C3-4 level via collaterals. The  remainder of the right vertebral artery is hypoplastic.    Right maxillary sinus air-fluid level.    Dense consolidation again seen in the dependent portions of the right  upper lobe.    This facility minimizes radiation dose by adjusting the mA and/or kV  according to each patient size.      This CT scan was performed using one or more the following dose  reduction techniques:    -Automated exposure control,  -Adjustment of the mA and/or kV according to patient's size, and/or,  -Use of iterative reconstruction technique.      JACQUELINE TAVERAS MD         SYSTEM ID:  [id number]   Troponin T, High Sensitivity   Result Value Ref Range    Troponin T, High Sensitivity <6 <=22 ng/L       Medications   sodium chloride 0.9% infusion (has no administration in time range)   labetalol (NORMODYNE/TRANDATE) injection 10 mg (has no administration in time range)     Or   hydrALAZINE (APRESOLINE) injection 10 mg (has no administration in time range)   niCARdipine 40 mg in 200 mL NS (CARDENE) infusion (has no administration in time range)   atorvastatin (LIPITOR) tablet 80 mg (has no administration in time range)   iopamidol (ISOVUE-370) solution 75 mL (75 mLs Intravenous $Given 5/15/23 1535)   aspirin (ASA) tablet 325 mg (325 mg Oral $Given 5/15/23 1757)   clopidogrel (PLAVIX) tablet 300 mg (300 mg Oral $Given 5/15/23 1757)       Assessments & Plan (with Medical Decision Making)     I have reviewed the nursing notes.    I have reviewed the findings, diagnosis, plan and need for follow up with the patient.  It appears that the patient did have a stroke yesterday.  I spoke with stroke neurology, Dr. Montilla, at the [facility] who was able to review the CT and CTA.  He recommended transfer and admission for evaluation for endarterectomy.  I called  [facility] and spoke with Dr. Moon, hospitalist and their stroke neurologist Dr. Tamez, and they have accepted the patient in transfer.  I have given him 300 mg Plavix, 325 mg aspirin, and 80 mg of Lipitor.  Patient is stable at this time.  We will go by ground ambulance.      New Prescriptions    No medications on file       Final diagnoses:   Cerebrovascular accident (CVA), unspecified mechanism (H)       5/15/2023   [facility]     Edward Parry MD  05/15/23 6226

## 2023-05-15 NOTE — ED TRIAGE NOTES
Pt comes from [facility] clinic where he was being seen for a clavicle issue, sent to ER with pt friend, friend states that pt has had balance issues for months-years, pt has hx of seizures and neurologist increased medications recently. Friend noticed that pt had left arm weakness yesterday morning, today pt experienced left leg weakness and word finding issues. pt presents and is able to move all extremities however eft arm is unable to raise above his head like the right arm. Provider is notified. No over head stroke code due to symptoms starting more than 24 hours prior to arrival.       Triage Assessment     Row Name 05/15/23 1437       Triage Assessment (Adult)    Airway WDL WDL       Respiratory WDL    Respiratory WDL WDL       Peripheral/Neurovascular WDL    Peripheral Neurovascular WDL WDL       Cognitive/Neuro/Behavioral WDL    Cognitive/Neuro/Behavioral WDL WDL

## 2023-05-15 NOTE — PROGRESS NOTES
Patient is here for follow up on his left clavicle.  Larisa Galdamez LPN .....................5/15/2023 1:50 PM

## 2023-05-15 NOTE — PROGRESS NOTES
Visit Date: 05/15/2023    This is a 57-year-old gentleman we are treating nonoperatively for a left clavicle fracture.  This is more of a distal clavicle fracture that is actually in very good apposition.  He was doing fine with that, but he noted that about 2-3 weeks ago, he started having significant difficulty using his left hand.  He states that it just does not do what he needs it to do.  No other issues that he noted.  Everything else has been just fine.  He is continuing to smoke as well.    PHYSICAL EXAMINATION:  His clavicle was a little bit proud, it is mildly tender to palpation.  He is taken out of the sling today, has full range of motion of his elbow.  Negative Tinel's at the elbow.  He has good strength with abduction and muscle bulk within the hand is normal, especially at the intrinsics.  He has grossly good motion and coordination, but states that he has no strength and difficulty with doing fine motor skills at the left hand and not with the right.  The right is fine.    He also has some mild droop on the left side of his face compared to the right.  He does not have any deviation of the tongue when he sticks his tongue out otherwise.    IMPRESSION AND PLAN:  This is a 57-year-old gentleman with a healing distal clavicle fracture on the left.  I think he is doing fine with the clavicle fracture, which on x-ray today appears to be healing just fine.  I think his issue with his left hand not working well, is that he may have had a stroke on the right side of his brain affecting the left side of his hand.  He also has some coolness to the left hand compared to the right as well. At this point, we are going to have him, given that it has been 2-3 weeks and this is an acute issue, I think he would benefit from seeing his primary doctor, so they can work him up for this.  We will see him back in approximately 3-4 weeks for followup on the left clavicle fracture.    Uri Duncan MD        D:  05/15/2023   T: 05/15/2023   MT: donal    Name:     NIDHI BUCKNER  MRN:      [id number]        Account:    [id number]   :      1965           Visit Date: 05/15/2023     Document: [id number]

## 2023-05-15 NOTE — CONSULTS
"[facility]    Stroke Telephone Note    I was called by Edward Parry on 05/15/23 regarding patient Raúl Adams. The patient is a 57 year old male who has had left face and arm weakness since yesterday and left LE weakness noted today. The patient is not on antithrombotics at home. He has history of seizures and is on keppra, tegretol, and dilantin.     Imaging Findings   Head CT: 2 areas of acute infarction in the right MCA territory.   CTA head and neck: severe stenosis of the right ICA origin, non visualization of the right vert from its origin to mid cervical segment, bilat V4 calcification with dolichoectatic dilatation on the left.     Impression  Acute ischemic stroke of right hemisphere due to large-artery atherosclerosis  This is likely symptomatic right ICA origin severe stenosis. The absent proximal half of the R vert is likely chronic atherosclerotic occlusion.     Recommendations   - Use orderset: \"Ischemic Stroke Routine Admission\" or \"Ischemic Stroke No Thrombolytics/No Thrombectomy ICU Admission\"  - Neurochecks and Vital Signs every 4 houtd   - Permissive HTN; goal -220 mmHg  - Daily aspirin 325 mg  - Plavix (clopidogrel) 300 mg PO loading dose x 1  - Plavix (clopidogrel) 75 mg PO Daily  - Statin: atorvastatin 80  - MRI Brain with and without contrast  - TTE with Bubble Study  - Evaluation for right CEA   - Telemetry, EKG  - Bedside Glucose Monitoring  - A1c, Lipid Panel, Troponin x 3  - PT/OT/SLP  - Stroke Education  - Euthermia, Euglycemia    My recommendations are based on the information provided over the phone by Raúl Adams's in-person providers. They are not intended to replace the clinical judgment of his in-person providers. I was not requested to personally see or examine the patient at this time.      Sebastian Escamilla MD, Msc, FAHA, FAAN   of Neurology  [facility]     05/15/2023 5:53 PM  To page me or covering stroke " "neurology team member, click here: [facility]COM  Choose \"On Call\" tab at top, then search dropdown box for \"Neurology Adult\" & press Enter, look for Neuro ICU/Stroke        "

## 2023-05-16 NOTE — ED NOTES
Meds 1 called.   [place] will be coming to transport pt to Floresita Childs RN on 5/15/2023 at 11:51 PM

## 2023-05-30 PROBLEM — I63.511 ACUTE ISCHEMIC RIGHT MCA STROKE (H): Status: ACTIVE | Noted: 2023-01-01

## 2023-05-30 PROBLEM — F17.200 TOBACCO USE DISORDER: Status: ACTIVE | Noted: 2023-01-01

## 2023-05-30 PROBLEM — G40.909 NONINTRACTABLE EPILEPSY WITHOUT STATUS EPILEPTICUS (H): Status: ACTIVE | Noted: 2023-01-01

## 2023-05-30 PROBLEM — I65.21 STENOSIS OF RIGHT CAROTID ARTERY: Status: ACTIVE | Noted: 2023-01-01

## 2023-05-30 NOTE — PROGRESS NOTES
Assessment & Plan   Raúl Adams is a 57 year old presenting for the following health issues:      ICD-10-CM    1. Hospital discharge follow-up  Z09       2. Acute ischemic right MCA stroke (H)  I63.511 Primary Care - Care Coordination Referral     clopidogrel (PLAVIX) 75 MG tablet      3. Mixed hyperlipidemia  E78.2 atorvastatin (LIPITOR) 80 MG tablet      4. Tobacco use disorder  F17.200         After reviewing medications with patient-he reports that he never picked up Plavix or or Lipitor after being discharged.  He states that he did not know that he was supposed to start these medications.  They were sent to a pharmacy in [place].  New prescription for these medications were sent to the local pharmacy-instructed him to start taking these medications.  Reviewed side effect profile of Keppra.  Suspect that his symptoms will level out over the next few weeks-encouraged him to touch base with neurology or come back in to be reevaluated if symptoms persist or worsen or if he develops any new symptoms.    Due to patient having difficulty managing medications-care coordination referral was placed.  There was a referral placed by [facility] for home health care to get set up for patient.  According to , there is no home health care that is able to take him on as a patient.  Social work is coordinating with patient to wait to help organize his medications-and to ensure adequate transportation to future neurology, PT, OT appointments.    No follow-ups on file.    CASIMIRO Simms [facility]      Sherrie Olsen is a 57 year old, presenting for the following health issues:    Patient presents to clinic for hospital follow-up for acute ischemic right MCA stroke-he was hospitalized at [facility] from 5 16-23 until 5-.  He presented to the ER on 5- with left-sided weakness in both upper and lower extremities-noting difficulty tying his  shoes on 5-.  MRI showed multifocal zones of early subacute acute cerebral infarction in the right hemisphere no mass effect or hemorrhage.  There was also small chronic infarcts in the right centrum semiovale.  CTA neck showed severe stenosis of right ICA and he had a CEA on 5-.  The strokes were felt to be related to ICA stenosis so he had a right CEA done by vascular surgery.  Neurology started him on Plavix and Lipitor and set him up for PT and OT.  It was recommended that he proceed with inpatient physical therapy but patient wanted to go home with home health care.  He was also instructed by neurology to slowly wean off of Dilantin and to start Keppra.    Patient states that he feels like he is back to baseline after his hospitalization.  He does notice that he has had increased dizziness, drowsiness since starting the Keppra.  Denies any new one-sided weakness, speech difficulties, visual changes.       Hospital F/U (Hospital follow up for stroke  Hansa Alegria LPN on 5/30/2023 at 11:18 AM/)    History of Present Illness       Reason for visit:  Hospital follow up  Symptom onset:  1-2 weeks ago  Symptom intensity:  Moderate  Symptom progression:  Improving  Had these symptoms before:  No    He eats 0-1 servings of fruits and vegetables daily.He consumes 0 sweetened beverage(s) daily.He exercises with enough effort to increase his heart rate 9 or less minutes per day.  He exercises with enough effort to increase his heart rate 3 or less days per week.   He is taking medications regularly.         Hospital Follow-up Visit:    Hospital/Nursing Home/ Rehab Facility: [facility]  Date of Admission: 5-16-23  Date of Discharge: 5-22-23  Reason(s) for Admission: possible stroke    Was your hospitalization related to COVID-19? No   Problems taking medications regularly:  None  Medication changes since discharge: None  Problems adhering to non-medication therapy:  None    Summary of hospitalization:  ALPHONSE  [facility] discharge summary reviewed  Diagnostic Tests/Treatments reviewed.  Follow up needed: none  Other Healthcare Providers Involved in Patient s Care:         Homecare and Specialist appointment - Neurology  Update since discharge: improved.         Plan of care communicated with patient                 Review of Systems   Constitutional: Positive for fatigue. Negative for fever.   Respiratory: Negative for shortness of breath and wheezing.    Cardiovascular: Negative for chest pain, palpitations and peripheral edema.   Neurological: Positive for light-headedness. Negative for dizziness, seizures, speech difficulty and weakness.   Psychiatric/Behavioral: Negative for confusion, hallucinations, mood changes and sleep disturbance.   All other systems reviewed and are negative.           Objective    /88 (BP Location: Right arm, Patient Position: Sitting, Cuff Size: Adult Regular)   Pulse 84   Temp 98.4  F (36.9  C)   Resp 20   Wt 68.6 kg (151 lb 4 oz)   SpO2 100%   BMI 18.90 kg/m    Body mass index is 18.9 kg/m .  Physical Exam  Vitals reviewed.   Constitutional:       General: He is not in acute distress.     Appearance: Normal appearance. He is not toxic-appearing.   HENT:      Head: Normocephalic and atraumatic.   Eyes:      General: No visual field deficit.        Right eye: No discharge.         Left eye: No discharge.      Extraocular Movements: Extraocular movements intact.      Conjunctiva/sclera: Conjunctivae normal.      Pupils: Pupils are equal, round, and reactive to light.   Neck:      Comments: Healing right CEA incision.  Cardiovascular:      Rate and Rhythm: Normal rate and regular rhythm.      Pulses: Normal pulses.      Heart sounds: Normal heart sounds. No murmur heard.  Musculoskeletal:         General: No swelling or deformity. Normal range of motion.      Right lower leg: No edema.      Left lower leg: No edema.      Comments: Continues to have pain in left  collarbone   Skin:     General: Skin is warm and dry.   Neurological:      General: No focal deficit present.      Mental Status: He is alert and oriented to person, place, and time. Mental status is at baseline.      GCS: GCS eye subscore is 4. GCS verbal subscore is 5. GCS motor subscore is 6.      Cranial Nerves: Cranial nerves 2-12 are intact. No cranial nerve deficit, dysarthria or facial asymmetry.      Sensory: No sensory deficit.      Motor: Motor function is intact. No weakness, tremor, atrophy, abnormal muscle tone, seizure activity or pronator drift.      Coordination: Coordination is intact. Coordination normal. Finger-Nose-Finger Test normal. Rapid alternating movements normal.      Gait: Abnormal gait:  Uses cane.   Psychiatric:         Mood and Affect: Mood normal.         Behavior: Behavior normal.

## 2023-05-30 NOTE — PATIENT INSTRUCTIONS
Make sure you are taking 75 mg of plavix daily and 80 mg of lipitor daily- prescriptions have been sent to [facility] for you to .
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
## 2023-05-31 NOTE — TELEPHONE ENCOUNTER
Requested Prescriptions   Pending Prescriptions Disp Refills     levETIRAcetam (KEPPRA) 750 MG tablet 180 tablet 3     Sig: Take 1 tablet (750 mg) by mouth 2 times daily   Historically reported    Last Office Visit:              Yesterday  Future Office visit:           None    In clinical absence of patient's primary, Yang Mackey, patient is requesting that this message be sent to the covering provider for consideration please.    Maci Pyle RN .............. 5/31/2023  12:47 PM

## 2023-05-31 NOTE — TELEPHONE ENCOUNTER
Reason for call: Medication or medication refill    Name of medication requested: levetiracetam    Are you out of the medication? YES    What pharmacy do you use? [facility]    Preferred method for responding to this message: Telephone Call    Phone number patient can be reached at: Cell number on file:    Telephone Information:   Mobile [phone]       If we cannot reach you directly, may we leave a detailed response at the number you provided? No    Patient stated he has already reached out the the pharmacy about this.    Tasneem Hernandez on 5/31/2023 at 12:41 PM

## 2023-05-31 NOTE — LETTER
[facility]  [address] [place]  [address]  Phone: [phone]        June 8, 2023      Raúl Adams  [address]   [facility] [address]      Dear Raúl,    I am the nurse care coordinator you spoke with.  Thank you for taking the time to talk about ways I might assist you.    In care coordination we can help you navigate the health care system, provide you with information about resources in the community and assist with appointments and communication with providers of care.       Please feel free to call me at [phone] with any concerns. I am happy to help!    Sincerely,    Rahel DUNN, RN    Care Coordinator  [facility]  [phone]          Enc: Travel reimbursement forms/ [facility]

## 2023-06-01 NOTE — TELEPHONE ENCOUNTER
Called and spoke to Patient after verifying last name and date of birth. Pt confirmed the dosing is 1000 mg BID. Maci Pyle RN .............. 6/1/2023  2:23 PM

## 2023-06-01 NOTE — TELEPHONE ENCOUNTER
Mulu Gross, CASIMIRO CNP  to Me        6/1/23 12:24 PM   Please clarify dosing patient is supposed to be taking- I believe it was 1,000 BID- if so could you line up orders?     Thanks

## 2023-06-09 NOTE — TELEPHONE ENCOUNTER
After verifying patient's name and date of birth, patient stated he did not need an appointment with Dr. Mackey now as he already seen Mulu Gross on 5-30-23.    Marie Melendez LPN....6/9/2023 2:30 PM

## 2023-06-09 NOTE — TELEPHONE ENCOUNTER
Patient needs a medication follow up, but nothing available till August. He does not want to wait that long.  He wants it in June.  Can he be worked in.      Jacki Camacho on 6/9/2023 at 12:15 PM

## 2023-06-19 NOTE — PROGRESS NOTES
Patient is here for follow up on his left clavicle fracture.  Larisa Galdamez LPN .....................6/19/2023 1:42 PM

## 2023-06-19 NOTE — PROGRESS NOTES
Visit Date: 2023    A 57-year-old gentleman status post left clavicle fracture.  This is largely healed at this point.  He is already in physical therapy for the stroke that he actually suffered the last time that he saw me.  We are going to get him into physical therapy for this, as well.    On examination, his clavicle appears to be healing nicely.  There is no aberrant motion at the distal clavicle.    X-ray examination of this shows good interval healing of a distal clavicle fracture on the left shoulder.    IMPRESSION AND PLAN:  A 57-year-old gentleman status post distal clavicle fracture, doing well at this point.  We are going to get him into physical therapy to get his shoulder moving.  Otherwise, he should be fine.    Uri Duncan MD        D: 2023   T: 2023   MT: vadim    Name:     NIDHI BUCKNER  MRN:      [id number]        Account:    [id number]   :      1965           Visit Date: 2023     Document: [id number]

## 2023-06-24 PROBLEM — I63.9 RECURRENT STROKES (H): Status: ACTIVE | Noted: 2023-01-01

## 2023-06-24 PROBLEM — I63.512 ACUTE ISCHEMIC LEFT MCA STROKE (H): Status: ACTIVE | Noted: 2023-01-01

## 2023-06-24 PROBLEM — I66.22 OCCLUSION OF LEFT POSTERIOR CEREBRAL ARTERY: Status: ACTIVE | Noted: 2023-01-01

## 2023-06-24 NOTE — CONSULTS
[facility]    Stroke Telephone Note    I was called by Radames Barrera on 06/24/23 regarding patient Raúl Adams. The patient is a 57 year old male with PMHx of stroke, HL, seizures, who presents with acute onset right hemiparesis, slowed speech, and lethargy (2pm).  He recently had a stroke and R CEA within the [facility] for severe R. ICA stenosis.    Wt 64.9 kg (143 lb)   BMI 17.87 kg/m       Stroke Code Data (for stroke code without tele)  Stroke code activated 06/24/23   1616   Stroke provider first response  06/24/23   1616            Last known normal 06/24/23   1400    Other (see comments) (unlikely the correct last known well time based upon Head CT)   Time of discovery   (or onset of symptoms) 06/24/23   1400   Head CT read by Stroke Neuro Dr/Provider 06/24/23   1635   Was stroke code de-escalated? Yes 06/24/23 1645          Imaging Findings  CT head: subacute R. MCA stroke, internal L. PCA stroke  CTA head/neck: scattered athero, occluded R vertebral artery V1-V2 segments with distal reconstitution.  Distal M2/M3 occlusion on left--likely acute.    Intravenous Thrombolysis  Not given due to:   - head trauma/stroke within the past 3 months    Endovascular Treatment  Not initiated due to absence of proximal vessel occlusion    Impression  1. Acute stroke--distal LM2 occlusion causing slowed speech and right hemiparesis  2. Subacute stroke--L. PCA stroke between 5/15 and now   3. Subacute/chronic stroke--R. MCA stroke secondary to presumed severe R. ICA stenosis ~ 6 weeks prior  4. Seizure d/o     Recommendations   1. Aspirin 325mg/Plavix 300mg  2. Transfer for stroke evaluation/monitoring for worsening  3. No acute intervention, lytics contraindicated, low NIHSS for thrombectomy with distal occlusion  4. Pan-scan for malignancy (hypercoagulability) and ANKIT if no etiology discovered.    My recommendations are based on the information provided over the phone by  "Raúl Adams's in-person providers. They are not intended to replace the clinical judgment of his in-person providers. I was not requested to personally see or examine the patient at this time.    Donell Tovar MD, MS  Vascular Neurology    To page me or covering stroke neurology team member, click here: AMCOM  Choose \"On Call\" tab at top, then select \"NEUROLOGY/ALL SITES\" from middle drop-down box, press Enter, then look for \"stroke\" or \"telestroke\" for your site.       I saw Raúl Adams on 06/24/23 as a STROKE CODE activation.  Raúl Adams was in critical condition due to acute onset neurologic deficits consisting of aphasia and right sided weakness due to suspected ischemic or hemorrhagic stroke--he was at high risk of neurologic deterioration from complications of stroke or stroke reperfusion therapy.  Both intravenous thrombolysis and endovascular thrombectomy were considered, but were ultimately deferred upon completion of his emergent clinical evaluation and review of his stat neuroimaging. The stroke code was de-escalated at that time.  I spent 90 minutes critical care decision-making time emergently evaluating and managing this patient's stroke code activation       "

## 2023-06-24 NOTE — ED TRIAGE NOTES
pt arrives to the Er with stroke symptoms. Stroke code called. Pt has hx of recent stroke. Pt having right arm weakness and drift.

## 2023-06-24 NOTE — CONSULTS
[facility]    Stroke Consult Note    Reason for Consult: Stroke Code     Chief Complaint: Stroke Symptoms      Please see phone note--patient subsequently seen on camera despite minor symptoms due to presence of M2 occlusion in order to evaluate candidacy for thrombectomy.  This note will fill in remainder of necessary data.  All recommendations are within prior phone note and discussed with ED physician by phone.    ______________________________________________________    Clinically Significant Risk Factors Present on Admission         # Hyponatremia: Lowest Na = 128 mmol/L in last 2 days, will monitor as appropriate       # Coagulation Defect: INR = 1.42 (Ref range: 0.85 - 1.15) and/or PTT = 31 Seconds (Ref range: 22 - 38 Seconds), will monitor for bleeding  # Drug Induced Platelet Defect: home medication list includes an antiplatelet medication               Past Medical History   Past Medical History:   Diagnosis Date     Seizures (H)      Past Surgical History   Past Surgical History:   Procedure Laterality Date     CAROTID ENDARTERECTOMY Right      COLONOSCOPY N/A 09/13/2022    2 sessile serrated and 1 hyperplastic, follow up 5 years, 9/13/27     Medications   Home Meds  Prior to Admission medications    Medication Sig Start Date End Date Taking? Authorizing Provider   carBAMazepine (TEGRETOL) 200 MG tablet Take 400 mg by mouth 2 times daily   Yes Unknown, Entered By History   levETIRAcetam (KEPPRA) 500 MG tablet Take 1.5 tablets (750 mg) by mouth every morning, and take 2 tablets (1,000 mg) every evening.   Yes Unknown, Entered By History   atorvastatin (LIPITOR) 80 MG tablet Take 1 tablet (80 mg) by mouth daily 5/30/23   Mulu Gross APRN CNP   clopidogrel (PLAVIX) 75 MG tablet Take 1 tablet (75 mg) by mouth daily 5/30/23   Mulu Gross APRN CNP   HYDROcodone-acetaminophen (NORCO) 5-325 MG tablet Take 1 tablet by mouth every 6 hours as needed for severe pain 4/8/23    Oscar, Yo Thao MD       Allergies   No Known Allergies  Family History   Family History   Problem Relation Age of Onset     Coronary Artery Disease Father      Social History   Social History     Tobacco Use     Smoking status: Every Day     Packs/day: 1.00     Types: Cigarettes     Smokeless tobacco: Never   Vaping Use     Vaping Use: Never used   Substance Use Topics     Alcohol use: Yes     Drug use: Not Currently       Review of Systems   The 10 point Review of Systems is negative other than noted in the HPI or here.        PHYSICAL EXAMINATION        SEE NIHSS, additionally:    Mental status: generally a bit slow to respond, frustrated, has difficulty with two-step commands, does not know his age, but knows his birthdate, small errors with repetition    Motor: distal RUE weakness more than proximal, residual mild LUE weakness    Dysphagia Screen  Per Nursing    Stroke Scales    NIHSS  1a. Level of Consciousness 0-->Alert, keenly responsive   1b. LOC Questions 0-->Answers both questions correctly   1c. LOC Commands 0-->Performs both tasks correctly   2.   Best Gaze 0-->Normal   3.   Visual 0-->No visual loss   4.   Facial Palsy 0-->Normal symmetrical movements   5a. Motor Arm, Left 0-->No drift, limb holds 90 (or 45) degrees for full 10 secs   5b. Motor Arm, Right 1-->Drift, limb holds 90 (or 45) degrees, but drifts down before full 10 secs, does not hit bed or other support   6a. Motor Leg, Left 0-->No drift, leg holds 30 degree position for full 5 secs   6b. Motor Leg, right 0-->No drift, leg holds 30 degree position for full 5 secs   7.   Limb Ataxia 0-->Absent   8.   Sensory 0-->Normal, no sensory loss   9.   Best Language 1-->Mild-to-moderate aphasia, some obvious loss of fluency or facility of comprehension, without significant limitation on ideas expressed or form of expression. Reduction of speech and/or comprehension, however, makes conversation. . . (see row details)   10. Dysarthria 0-->Normal    11. Extinction and Inattention  1-->Visual, tactile, auditory, spatial, or personal inattention or extinction to bilateral simultaneous stimulation in one of the sensory modalities   Total 3 (06/24/23 1738)       Modified [place] Score (Pre-morbid)  3-Moderate disability; requiring some help, but able to walk without assistance    Imaging  I personally reviewed all imaging; relevant findings per HPI.     Lab Results Data   CBC  Recent Labs   Lab 06/24/23  1619   WBC 7.0   RBC 3.60*   HGB 11.5*   HCT 33.6*        Basic Metabolic Panel    Recent Labs   Lab 06/24/23  1655 06/24/23  1619   NA  --  128*   POTASSIUM  --  3.9   CHLORIDE  --  94*   CO2  --  22   BUN  --  9.2   CR  --  0.60*   * 128*   SUZAN  --  8.8     Liver Panel  No results for input(s): PROTTOTAL, ALBUMIN, BILITOTAL, ALKPHOS, AST, ALT, BILIDIRECT in the last 168 hours.  INR    Recent Labs   Lab Test 06/24/23  1619 05/15/23  1445 04/08/23  0950   INR 1.42* 1.03 1.05      Lipid Profile  No lab results found.  A1C  No lab results found.  Troponin    Recent Labs   Lab 06/24/23  1619   CTROPT 158*          Stroke Code Data Data   Stroke Code Data  (for stroke code with tele)  Stroke code activated 06/24/23   1616   First stroke provider response 06/24/23   1616   Video start time 06/24/23   1719   Video end time 06/24/23   1754   Last known normal 06/24/23   1400   Time of discovery  (or onset of symptoms)  06/24/23   1400   Head CT read by Stroke Neuro Dr/Provider 06/24/23   1635   Was stroke code de-escalated? Yes 06/24/23 1645           Telestroke Service Details  Type of service telemedicine diagnostic assessment of acute neurological changes   Reason telemedicine is appropriate patient requires assessment with a specialist for diagnosis and treatment of neurological symptoms   Mode of transmission secure interactive audio and video communication per Ruthie   Originating site (patient location) [facility]  site (provider location) Provider remote site

## 2023-06-24 NOTE — ED PROVIDER NOTES
Emergency Department Provider Note  : 1965 Age: 57 year old Sex: male MRN: [id number]    Chief Complaint   Patient presents with     Stroke Symptoms       Medical Decision Making / Assessment / Plan   57 year old male presenting with recurrent strokes, subacute left posterior cerebral artery stroke, acute left MCA stroke, M2 branch.    ED Course as of 23 1741   Sat 2023   1624 Patient evaluated.  Tier 1 stroke code called.  Further history and chart reviewed.  Patient had right MCA stroke on 5/15/2023, and is a contraindication to thrombolytics.  At the time of his stroke on 5/15/2023, also underwent right-sided carotid endarterectomy.    Last known well today was approximately 2 PM.  He called his friend with right arm weakness.    ED stroke order set initiated.  Spoke with stroke neurology.  Patient is a stroke candidate given his recent stroke and carotid endarterectomy.  CTA and CT imaging ordered.    Care plan to be developed after imaging.   1640 INR 1.42.    PTT 31.    CBC shows white count of 7.0, Hgb 11.5, platelets 194   1641 Patient is currently taking Lipitor 80 mg daily, taking Plavix 75 mg daily.    Indicates that he is still smoking and drinking.    1643 Radiology called with results, indicates subacute left posterior cerebral artery infarction.   1653 Called by lab with elevated troponin of 158.     EKG shows sinus tachycardia with a rate of 102.  QTc measuring 453 ms.  Otherwise normal EKG.   1707 Sodium 128, Creatinine 0.6, glucose 128.   1707 Stroke neurology: Communicates subacute left posterior lower stroke.  New, acute, M2 left MCA stroke.    Given 3 strokes in the last 6 weeks, plan is for hospitalization.    Transthoracic echocardiogram on Monday.    MRI of the brain on Monday.  Full body CT to evaluate for malignancy.  May need to consider transesophageal echocardiogram.   1712 Patient indicates that he stopped taking the blood thinner, which he believes was Plavix,  after 21 days following his neck surgery.  He is not currently taking atorvastatin/Lipitor.  No longer taking Keppra--or never started taking Keppra.  He was supposed to taper off carbamazepine.    Indicates that he is taking carbamazepine.      300 mg Plavix load ordered.  324 mg chewable aspirin ordered.    May need transthoracic echo versus transesophageal echo.  Brain MRI.  Concern for possible left ventricular thrombus, versus malignancy given that this is now his third stroke in 6 weeks.   1726 Spoke with hospitalist at [facility] as well as interventionalist stroke neurologist at [facility] who accepted patient for transfer.        New Prescriptions    No medications on file       Final diagnoses:   Occlusion of left posterior cerebral artery   Acute ischemic left MCA stroke (H)   Recurrent strokes (H)       Radames Barrera MD  6/24/2023   Emergency Department    Sherrie Olsen is a 57 year old male who presents at  4:17 PM with Right arm weakness with mild right leg weakness today, approximately 2 PM.  Brought in by friend who indicates he was also not acting appropriately about 2 days ago.    Has not been taking aspirin, Plavix, Lipitor.  Was supposed to be taking Keppra but has not taken at this time.    Reports he is taking his Dilantin.    I have reviewed the Medications, Allergies, Past Medical and Surgical History, and Social History in the Epic System and with family.    Review of Systems:  Please see Subjective / HPI for pertinent positives and negatives. All other systems reviewed and found to be negative.      Objective     Patient Vitals for the past 24 hrs:   Weight   06/24/23 1709 64.9 kg (143 lb)       Physical Exam:     General: Initially alert but somewhat slow to respond, in no acute respiratory distress. Eyes are open.  Patient is following commands.  Head: Normocephalic, atraumatic.  Eyes: Conjugate gaze.  PERRL, EOMI  ENT: Moist membranes, external ear appears normal.    Chest/Respiratory: Equal chest rise, clear bilaterally.  Cardiovascular: Peripheral pulses present, tachycardic rate, regular rhythm.  Abdominal: Soft, non-distended, non-tender.  Extremities: Significant right arm drift, arm drops to the chest when trying to raise his arms out.  Mild right leg weakness with raising his legs from the bed..  Neurological: GCS 15, right arm and minimal right leg weakness.  Skin: Warm, no rashes, lesions.  Psychiatric: flat affect.      Procedures / Critical Care   Procedures    Aggregate Critical Care Time: is 35 minutes.  This was the time seeing the patient at the bedside while the patient was critical.  My time did not include any pertinent procedures or activities that did not contribute to the patient's care while the patient was critical.      Orders Placed This Encounter   Procedures     CT Head w/o Contrast     CTA Head Neck with Contrast     Basic metabolic panel     INR     Partial thromboplastin time     Troponin T, High Sensitivity     Extra Tube     Extra Red Top Tube     CBC with platelets and differential     Glucose by meter     EKG 12-lead, tracing only     Activate Code Stroke - Tier 1     Notify CT that stroke patient is in ED     Weigh patient     Glucose monitor nursing POCT     Vital Signs - For Code Stroke     Neuro Checks - For Code Stroke     Cardiac Continuous Monitoring     Pulse oximetry nursing     Activity Bedrest     Head of bed     Dysphagia Screen     Peripheral IV catheter     IV Access: Insert 2nd peripheral IV     Oxygen: Nasal cannula, Oxygen mask     CBC with Platelets & Differential       RESULTS: As noted above.     Results for orders placed or performed during the hospital encounter of 06/24/23   CT Head w/o Contrast     Status: None    Narrative    PROCEDURE: CT HEAD W/O CONTRAST   6/24/2023 4:28 PM    HISTORY:Male, age,  57 years, , , Code Stroke to evaluate for  potential thrombolysis and thrombectomy. PLEASE READ  IMMEDIATELY.    COMPARISON: CT 5/15/2023    TECHNIQUE: CT of the brain without contrast. Axial; sagittal and  coronal reconstructed images were reviewed.    FINDINGS: Ventricles and sulci are similar appearance again  demonstrating mild generalized atrophy and localized foci of  encephalomalacia. Gray and white matter demonstrate demonstrate patchy  directed decreased density in the humeral hemispheres. There is a new  bilobed focus of gray and white matter differentiation involving the  left occipital lobe and temporal lobe, concerning for subacute  ischemia/infarction in the left posterior cerebral artery  distribution.    There is no evidence of acute hemorrhage. No acute fracture.       Impression    IMPRESSION:   No evidence of acute intracranial hemorrhage.     New area of decreased density and loss of gray-white matter  differentiation in the left occipital lobe concerning for subacute  infarct in the left posterior cerebral artery.    Results were discussed with Dr. Barrera at 1641 hours 6/24/2023.          This facility minimizes radiation dose by adjusting the mA and/or kV  according to each patient size.      This CT scan was performed using one or more the following dose  reduction techniques:    -Automated exposure control,  -Adjustment of the mA and/or kV according to patient's size, and/or,  -Use of iterative reconstruction technique.      JACQUELINE TAVERAS MD         SYSTEM ID:  [id number]   CTA Head Neck with Contrast     Status: None    Narrative    PROCEDURE: CTA HEAD NECK W CONTRAST   6/24/2023 4:31 PM    HISTORY:Male, age,  57 years, , , Code Stroke to evaluate for  potential thrombolysis and thrombectomy. PLEASE READ IMMEDIATELY.    COMPARISON: CT angiogram head and neck 5/15/2023    TECHNIQUE: CT scan was performed of the head and neck  before and  after the administration of intravenous contrast. Sagittal, coronal,  axial and 3-D reconstructed MIP  images were reviewed.    FINDINGS:   CTA  neck: Minus portions of the aortic arch are unremarkable.  Innominate artery, left and right subclavian arteries, left and right  common carotid arteries and the left vertebral artery are patent.  Chronic occlusion of the right vertebral artery is similar in  appearance. Mixed calcified noncalcified plaque again seen within the  carotid bifurcations without significant stenosis cervical clips are  now seen adjacent to the right carotid bifurcation consistent with a  history of prior carotid endarterectomy. There is now low signal  filling defects seen within the origin of the right external carotid  artery which more distally is normal in appearance. The right external  carotid artery branches are unremarkable. Left external carotid artery  branches are also unremarkable.    CTA brain: There is reconstitution of the right vertebral artery via  collateral vessels at the C3-4 level, similar in appearance compared  to prior study. Hypoplasia distal right vertebral artery, ectasia of  the basilar artery with atherosclerotic calcifications are similar in  appearance. Posterior cerebral arteries and branches are unremarkable.  The petrous and cavernous portions of the left and right internal  carotid artery are similar in appearance. Assess carotid  calcifications are similar in appearance. The anterior and middle  cerebral arteries and respective branches are also unremarkable.    Contrast-enhanced CT scan of the neck: The muscles of mastication and  the salivary glands are unremarkable. The overall and pharyngeal  mucosa appear normal. Larynx and the thyroid gland are grossly normal.  Dense consolidation with severe Castillo changes are again seen within  the right upper lobe. There are number of enlarged lymph nodes seen  within the visualized portions of the mediastinum.    Contrast-enhanced CT scan of the brain: Gray and white matter  differentiation in the left occipital lobe as seen on recent CT scan  demonstrates  no evidence of abnormal enhancement. Mild generalized  atrophy and white matter changes/encephalomalacia are similar in  appearance compared to the previous CTA.       Impression    IMPRESSION:   No evidence of acute vascular abnormality.    Left posterior cerebral artery and branches appear to be patent.  Findings on recent CT scan due to not appear to be related to an acute  vascular sulci or enhancing lesion and may represent encephalomalacia  from a late subacute hypoperfusion vascular insult.     Dense consolidation again seen within the right lung with enlarged  mediastinal and hilar lymphadenopathy.    Postoperative changes consistent with right carotid endarterectomy  with localized narrowing involving the origin of the right external  carotid artery.    Chronic occlusion of the right vertebral artery with reconstitution  distally similar in appearance compared to prior study.    This facility minimizes radiation dose by adjusting the mA and/or kV  according to each patient size.    This CT scan was performed using one or more the following dose  reduction techniques:    -Automated exposure control,  -Adjustment of the mA and/or kV according to patient's size, and/or,  -Use of iterative reconstruction technique.      JACQUELINE TAVERAS MD         SYSTEM ID:  [id number]   Basic metabolic panel     Status: Abnormal   Result Value Ref Range    Sodium 128 (L) 136 - 145 mmol/L    Potassium 3.9 3.4 - 5.3 mmol/L    Chloride 94 (L) 98 - 107 mmol/L    Carbon Dioxide (CO2) 22 22 - 29 mmol/L    Anion Gap 12 7 - 15 mmol/L    Urea Nitrogen 9.2 6.0 - 20.0 mg/dL    Creatinine 0.60 (L) 0.67 - 1.17 mg/dL    Calcium 8.8 8.6 - 10.0 mg/dL    Glucose 128 (H) 70 - 99 mg/dL    GFR Estimate >90 >60 mL/min/1.73m2   INR     Status: Abnormal   Result Value Ref Range    INR 1.42 (H) 0.85 - 1.15   Partial thromboplastin time     Status: Normal   Result Value Ref Range    aPTT 31 22 - 38 Seconds   Troponin T, High Sensitivity     Status:  Abnormal   Result Value Ref Range    Troponin T, High Sensitivity 158 (HH) <=22 ng/L   Extra Tube     Status: None    Narrative    The following orders were created for panel order Extra Tube.  Procedure                               Abnormality         Status                     ---------                               -----------         ------                     Extra Red Top Tube[[id number]]                               Final result                 Please view results for these tests on the individual orders.   Extra Red Top Tube     Status: None   Result Value Ref Range    Hold Specimen JI    CBC with platelets and differential     Status: Abnormal   Result Value Ref Range    WBC Count 7.0 4.0 - 11.0 10e3/uL    RBC Count 3.60 (L) 4.40 - 5.90 10e6/uL    Hemoglobin 11.5 (L) 13.3 - 17.7 g/dL    Hematocrit 33.6 (L) 40.0 - 53.0 %    MCV 93 78 - 100 fL    MCH 31.9 26.5 - 33.0 pg    MCHC 34.2 31.5 - 36.5 g/dL    RDW 13.2 10.0 - 15.0 %    Platelet Count 194 150 - 450 10e3/uL    % Neutrophils 77 %    % Lymphocytes 10 %    % Monocytes 12 %    % Eosinophils 1 %    % Basophils 0 %    % Immature Granulocytes 0 %    NRBCs per 100 WBC 0 <1 /100    Absolute Neutrophils 5.4 1.6 - 8.3 10e3/uL    Absolute Lymphocytes 0.7 (L) 0.8 - 5.3 10e3/uL    Absolute Monocytes 0.8 0.0 - 1.3 10e3/uL    Absolute Eosinophils 0.1 0.0 - 0.7 10e3/uL    Absolute Basophils 0.0 0.0 - 0.2 10e3/uL    Absolute Immature Granulocytes 0.0 <=0.4 10e3/uL    Absolute NRBCs 0.0 10e3/uL   Glucose by meter     Status: Abnormal   Result Value Ref Range    GLUCOSE BY METER POCT 133 (H) 70 - 99 mg/dL   CBC with Platelets & Differential     Status: Abnormal    Narrative    The following orders were created for panel order CBC with Platelets & Differential.  Procedure                               Abnormality         Status                     ---------                               -----------         ------                     CBC with platelets and d...[[id number]]   Abnormal            Final result                 Please view results for these tests on the individual orders.             Medical/Surgical History:  Past Medical History:   Diagnosis Date     Seizures (H)      Past Surgical History:   Procedure Laterality Date     CAROTID ENDARTERECTOMY Right      COLONOSCOPY N/A 09/13/2022    2 sessile serrated and 1 hyperplastic, follow up 5 years, 9/13/27       Medications:  No current facility-administered medications for this encounter.     Current Outpatient Medications   Medication     atorvastatin (LIPITOR) 80 MG tablet     carBAMazepine (TEGRETOL XR) 200 MG 12 hr tablet     clopidogrel (PLAVIX) 75 MG tablet     HYDROcodone-acetaminophen (NORCO) 5-325 MG tablet     levETIRAcetam (KEPPRA) 1000 MG tablet     levETIRAcetam (KEPPRA) 750 MG tablet     phenytoin (DILANTIN) 100 MG ER capsule       Allergies:  Patient has no known allergies.    Relevant labs, images, EKGs, Epic and outside hospital (if applicable) charts were reviewed. The findings, diagnosis, plan, and need for follow up were discussed with the patient/family. Nursing notes were reviewed.      Radames Barrera MD  06/24/23 7511

## 2023-10-11 NOTE — PHARMACY-ADMISSION MEDICATION HISTORY
"Pharmacist Admission Medication History    Admission medication history is complete. The information provided in this note is only as accurate as the sources available at the time of the update.    Medication reconciliation/reorder completed by provider prior to medication history? No    Information Source(s): Hospital records- reviewed recent [facility] neurology and cardiology records, as well as 5/30 [facility] office visit.      Unclear if patient is reliable source of information at this time due to stroke code status- family member states that he may be forgetful compared to baseline at this time.    Pertinent Information:   -unclear if patient is still taking his Plavix- states he didn't think he needed to take that medication anymore.    -per 5/30 GI office visit note, patient initially had not picked up his Plavix and Atorvastatin from the pharmacy- new Rx's were sent in at that time, but unclear if these were ever picked up. At discharge, was prescribed Plavix 75 mg/day, but per discharge note, \"Continue Plavix 75 mg, defer further antiplatelet management to vascular surgery.\"    -recent discontinuation of seizure med, per relative- upon review of neurology notes, this appears to be Phenytoin, which it appears pt was supposed to taper off of over 6 weeks, pt to continue Carbamazepine 400 mg BID, and adjust dose of Keppra to 750 mg QAM + 1000 mg QPM due to dizziness (per 6/5 neurology telephone note).           Changes made to PTA medication list:    Added:   o Levetiracetam 500 mg tab  o Carbamazepine IR tab    Deleted:   o Phenytoin  o Levetiracetam 750 mg and 1,000 mg tabs  o Carbamazepine 500 mg ER tab    Changed: None      Medication Affordability: unable to accurately assess       Allergies reviewed with patient and updates made in EHR: unable to assess       Medication History Completed By: Ray Montenegro [facility] 6/24/2023 5:47 PM    Prior to Admission medications    Medication Sig Last Dose Taking? Auth " Provider Long Term End Date   carBAMazepine (TEGRETOL) 200 MG tablet Take 400 mg by mouth 2 times daily  Yes Unknown, Entered By History Yes    levETIRAcetam (KEPPRA) 500 MG tablet Take 1.5 tablets (750 mg) by mouth every morning, and take 2 tablets (1,000 mg) every evening.  Yes Unknown, Entered By History No    atorvastatin (LIPITOR) 80 MG tablet Take 1 tablet (80 mg) by mouth daily   Mulu Gross, CASIMIRO CNP Yes    clopidogrel (PLAVIX) 75 MG tablet Take 1 tablet (75 mg) by mouth daily   Mulu Gross, CASIMIRO CNP Yes    HYDROcodone-acetaminophen (NORCO) 5-325 MG tablet Take 1 tablet by mouth every 6 hours as needed for severe pain   Yo Moore MD          no

## 2024-05-24 DIAGNOSIS — E78.2 MIXED HYPERLIPIDEMIA: ICD-10-CM

## 2024-05-24 RX ORDER — ATORVASTATIN CALCIUM 80 MG/1
80 TABLET, FILM COATED ORAL DAILY
Qty: 90 TABLET | Refills: 1 | OUTPATIENT
Start: 2024-05-24

## (undated) DEVICE — ENDO BRUSH CHANNEL MASTER CLEANING 2-4.2MM BW-412T

## (undated) DEVICE — ENDO KIT COMPLIANCE DYKENDOCMPLY

## (undated) DEVICE — ESU GROUND PAD ADULT W/CORD E7507

## (undated) DEVICE — SOL WATER 1500ML

## (undated) DEVICE — TUBING SUCTION 10'X3/16" N510

## (undated) DEVICE — SUCTION MANIFOLD NEPTUNE 2 SYS 4 PORT 0702-020-000

## (undated) DEVICE — ESU ENDO FORCEP BX HOT FD-210U

## (undated) DEVICE — ENDO TRAP POLYP E-TRAP 00711099

## (undated) RX ORDER — CLOPIDOGREL BISULFATE 75 MG/1
TABLET ORAL
Status: DISPENSED
Start: 2023-01-01

## (undated) RX ORDER — ATORVASTATIN CALCIUM 40 MG/1
TABLET, FILM COATED ORAL
Status: DISPENSED
Start: 2023-01-01

## (undated) RX ORDER — ASPIRIN 325 MG
TABLET ORAL
Status: DISPENSED
Start: 2023-01-01

## (undated) RX ORDER — ASPIRIN 81 MG/1
TABLET, CHEWABLE ORAL
Status: DISPENSED
Start: 2023-01-01